# Patient Record
Sex: MALE | Race: BLACK OR AFRICAN AMERICAN | URBAN - METROPOLITAN AREA
[De-identification: names, ages, dates, MRNs, and addresses within clinical notes are randomized per-mention and may not be internally consistent; named-entity substitution may affect disease eponyms.]

---

## 2018-04-01 ENCOUNTER — OUTPATIENT (OUTPATIENT)
Dept: OUTPATIENT SERVICES | Facility: HOSPITAL | Age: 58
LOS: 1 days | End: 2018-04-01
Payer: MEDICAID

## 2018-04-01 PROCEDURE — G9001: CPT

## 2018-04-20 ENCOUNTER — INPATIENT (INPATIENT)
Facility: HOSPITAL | Age: 58
LOS: 9 days | Discharge: HOME | End: 2018-04-30
Attending: INTERNAL MEDICINE | Admitting: INTERNAL MEDICINE
Payer: MEDICAID

## 2018-04-20 VITALS
TEMPERATURE: 98 F | SYSTOLIC BLOOD PRESSURE: 182 MMHG | WEIGHT: 214.95 LBS | HEIGHT: 72 IN | OXYGEN SATURATION: 99 % | RESPIRATION RATE: 18 BRPM | HEART RATE: 77 BPM | DIASTOLIC BLOOD PRESSURE: 110 MMHG

## 2018-04-20 PROBLEM — Z00.00 ENCOUNTER FOR PREVENTIVE HEALTH EXAMINATION: Status: ACTIVE | Noted: 2018-04-20

## 2018-04-20 LAB
ABO RH CONFIRMATION: SIGNIFICANT CHANGE UP
ALBUMIN SERPL ELPH-MCNC: 3.1 G/DL — LOW (ref 3.3–5)
ALP SERPL-CCNC: 56 U/L — SIGNIFICANT CHANGE UP (ref 40–120)
ALT FLD-CCNC: 39 U/L — SIGNIFICANT CHANGE UP (ref 12–78)
ANION GAP SERPL CALC-SCNC: 16 MMOL/L — SIGNIFICANT CHANGE UP (ref 5–17)
APPEARANCE UR: CLEAR — SIGNIFICANT CHANGE UP
AST SERPL-CCNC: 20 U/L — SIGNIFICANT CHANGE UP (ref 15–37)
BACTERIA # UR AUTO: ABNORMAL
BASOPHILS # BLD AUTO: 0.01 K/UL — SIGNIFICANT CHANGE UP (ref 0–0.2)
BASOPHILS NFR BLD AUTO: 0.1 % — SIGNIFICANT CHANGE UP (ref 0–2)
BILIRUB SERPL-MCNC: 0.4 MG/DL — SIGNIFICANT CHANGE UP (ref 0.2–1.2)
BILIRUB UR-MCNC: NEGATIVE — SIGNIFICANT CHANGE UP
BLD GP AB SCN SERPL QL: SIGNIFICANT CHANGE UP
BUN SERPL-MCNC: 156 MG/DL — SIGNIFICANT CHANGE UP (ref 7–23)
CALCIUM SERPL-MCNC: 7.1 MG/DL — LOW (ref 8.5–10.1)
CHLORIDE SERPL-SCNC: 110 MMOL/L — HIGH (ref 96–108)
CK MB CFR SERPL CALC: 3.7 NG/ML — HIGH (ref 0.5–3.6)
CK SERPL-CCNC: 154 U/L — SIGNIFICANT CHANGE UP (ref 26–308)
CO2 SERPL-SCNC: 16 MMOL/L — LOW (ref 22–31)
COLOR SPEC: YELLOW — SIGNIFICANT CHANGE UP
CREAT SERPL-MCNC: 13.5 MG/DL — HIGH (ref 0.5–1.3)
D DIMER BLD IA.RAPID-MCNC: 534 NG/ML DDU — HIGH
DIFF PNL FLD: ABNORMAL
EOSINOPHIL # BLD AUTO: 0.03 K/UL — SIGNIFICANT CHANGE UP (ref 0–0.5)
EOSINOPHIL NFR BLD AUTO: 0.2 % — SIGNIFICANT CHANGE UP (ref 0–6)
EPI CELLS # UR: SIGNIFICANT CHANGE UP
GLUCOSE SERPL-MCNC: 103 MG/DL — HIGH (ref 70–99)
GLUCOSE UR QL: NEGATIVE MG/DL — SIGNIFICANT CHANGE UP
HCT VFR BLD CALC: 19.6 % — CRITICAL LOW (ref 39–50)
HGB BLD-MCNC: 6.4 G/DL — CRITICAL LOW (ref 13–17)
IMM GRANULOCYTES NFR BLD AUTO: 1.5 % — SIGNIFICANT CHANGE UP (ref 0–1.5)
KETONES UR-MCNC: NEGATIVE — SIGNIFICANT CHANGE UP
LACTATE SERPL-SCNC: 1.1 MMOL/L — SIGNIFICANT CHANGE UP (ref 0.7–2)
LEUKOCYTE ESTERASE UR-ACNC: NEGATIVE — SIGNIFICANT CHANGE UP
LYMPHOCYTES # BLD AUTO: 1.04 K/UL — SIGNIFICANT CHANGE UP (ref 1–3.3)
LYMPHOCYTES # BLD AUTO: 8.1 % — LOW (ref 13–44)
MAGNESIUM SERPL-MCNC: 1.4 MG/DL — LOW (ref 1.6–2.6)
MCHC RBC-ENTMCNC: 27.1 PG — SIGNIFICANT CHANGE UP (ref 27–34)
MCHC RBC-ENTMCNC: 32.7 GM/DL — SIGNIFICANT CHANGE UP (ref 32–36)
MCV RBC AUTO: 83.1 FL — SIGNIFICANT CHANGE UP (ref 80–100)
MONOCYTES # BLD AUTO: 2.01 K/UL — HIGH (ref 0–0.9)
MONOCYTES NFR BLD AUTO: 15.6 % — HIGH (ref 2–14)
NEUTROPHILS # BLD AUTO: 9.62 K/UL — HIGH (ref 1.8–7.4)
NEUTROPHILS NFR BLD AUTO: 74.5 % — SIGNIFICANT CHANGE UP (ref 43–77)
NITRITE UR-MCNC: NEGATIVE — SIGNIFICANT CHANGE UP
NRBC # BLD: 0 /100 WBCS — SIGNIFICANT CHANGE UP (ref 0–0)
NT-PROBNP SERPL-SCNC: HIGH PG/ML (ref 0–125)
PH UR: 5 — SIGNIFICANT CHANGE UP (ref 5–8)
PLATELET # BLD AUTO: 155 K/UL — SIGNIFICANT CHANGE UP (ref 150–400)
POTASSIUM SERPL-MCNC: 5.4 MMOL/L — HIGH (ref 3.5–5.3)
POTASSIUM SERPL-SCNC: 5.4 MMOL/L — HIGH (ref 3.5–5.3)
PROT SERPL-MCNC: 6.3 GM/DL — SIGNIFICANT CHANGE UP (ref 6–8.3)
PROT UR-MCNC: 500 MG/DL
RBC # BLD: 2.36 M/UL — LOW (ref 4.2–5.8)
RBC # FLD: 16.4 % — HIGH (ref 10.3–14.5)
RBC CASTS # UR COMP ASSIST: ABNORMAL /HPF (ref 0–4)
SODIUM SERPL-SCNC: 142 MMOL/L — SIGNIFICANT CHANGE UP (ref 135–145)
SP GR SPEC: 1.01 — SIGNIFICANT CHANGE UP (ref 1.01–1.02)
TROPONIN I SERPL-MCNC: 0.05 NG/ML — HIGH (ref 0.01–0.04)
TROPONIN I SERPL-MCNC: 0.05 NG/ML — HIGH (ref 0.01–0.04)
UROBILINOGEN FLD QL: NEGATIVE MG/DL — SIGNIFICANT CHANGE UP
WBC # BLD: 12.9 K/UL — HIGH (ref 3.8–10.5)
WBC # FLD AUTO: 12.9 K/UL — HIGH (ref 3.8–10.5)
WBC UR QL: SIGNIFICANT CHANGE UP

## 2018-04-20 PROCEDURE — 99291 CRITICAL CARE FIRST HOUR: CPT

## 2018-04-20 PROCEDURE — 71045 X-RAY EXAM CHEST 1 VIEW: CPT | Mod: 26

## 2018-04-20 PROCEDURE — 71250 CT THORAX DX C-: CPT | Mod: 26

## 2018-04-20 PROCEDURE — 93970 EXTREMITY STUDY: CPT | Mod: 26

## 2018-04-20 RX ORDER — AMLODIPINE BESYLATE 2.5 MG/1
10 TABLET ORAL DAILY
Qty: 0 | Refills: 0 | Status: DISCONTINUED | OUTPATIENT
Start: 2018-04-21 | End: 2018-04-30

## 2018-04-20 RX ORDER — METOPROLOL TARTRATE 50 MG
50 TABLET ORAL DAILY
Qty: 0 | Refills: 0 | Status: DISCONTINUED | OUTPATIENT
Start: 2018-04-21 | End: 2018-04-22

## 2018-04-20 RX ORDER — MAGNESIUM SULFATE 500 MG/ML
2 VIAL (ML) INJECTION ONCE
Qty: 0 | Refills: 0 | Status: COMPLETED | OUTPATIENT
Start: 2018-04-20 | End: 2018-04-20

## 2018-04-20 RX ORDER — AMLODIPINE BESYLATE 2.5 MG/1
5 TABLET ORAL DAILY
Qty: 0 | Refills: 0 | Status: DISCONTINUED | OUTPATIENT
Start: 2018-04-20 | End: 2018-04-20

## 2018-04-20 RX ORDER — FUROSEMIDE 40 MG
60 TABLET ORAL ONCE
Qty: 0 | Refills: 0 | Status: COMPLETED | OUTPATIENT
Start: 2018-04-20 | End: 2018-04-20

## 2018-04-20 RX ORDER — CHOLECALCIFEROL (VITAMIN D3) 125 MCG
400 CAPSULE ORAL DAILY
Qty: 0 | Refills: 0 | Status: DISCONTINUED | OUTPATIENT
Start: 2018-04-20 | End: 2018-04-30

## 2018-04-20 RX ORDER — CEFTRIAXONE 500 MG/1
1 INJECTION, POWDER, FOR SOLUTION INTRAMUSCULAR; INTRAVENOUS ONCE
Qty: 0 | Refills: 0 | Status: COMPLETED | OUTPATIENT
Start: 2018-04-20 | End: 2018-04-20

## 2018-04-20 RX ORDER — METOPROLOL TARTRATE 50 MG
25 TABLET ORAL DAILY
Qty: 0 | Refills: 0 | Status: DISCONTINUED | OUTPATIENT
Start: 2018-04-20 | End: 2018-04-20

## 2018-04-20 RX ORDER — PETROLATUM,WHITE
1 JELLY (GRAM) TOPICAL
Qty: 0 | Refills: 0 | Status: DISCONTINUED | OUTPATIENT
Start: 2018-04-20 | End: 2018-04-30

## 2018-04-20 RX ORDER — AMLODIPINE BESYLATE 2.5 MG/1
0 TABLET ORAL
Qty: 0 | Refills: 0 | COMMUNITY

## 2018-04-20 RX ORDER — ALLOPURINOL 300 MG
1 TABLET ORAL
Qty: 0 | Refills: 0 | COMMUNITY

## 2018-04-20 RX ORDER — FUROSEMIDE 40 MG
40 TABLET ORAL ONCE
Qty: 0 | Refills: 0 | Status: COMPLETED | OUTPATIENT
Start: 2018-04-20 | End: 2018-04-20

## 2018-04-20 RX ORDER — ACETAMINOPHEN 500 MG
650 TABLET ORAL EVERY 6 HOURS
Qty: 0 | Refills: 0 | Status: DISCONTINUED | OUTPATIENT
Start: 2018-04-20 | End: 2018-04-30

## 2018-04-20 RX ADMIN — Medication 1 APPLICATION(S): at 18:23

## 2018-04-20 RX ADMIN — CEFTRIAXONE 100 GRAM(S): 500 INJECTION, POWDER, FOR SOLUTION INTRAMUSCULAR; INTRAVENOUS at 12:41

## 2018-04-20 RX ADMIN — Medication 40 MILLIGRAM(S): at 22:20

## 2018-04-20 RX ADMIN — Medication 50 GRAM(S): at 13:33

## 2018-04-20 RX ADMIN — Medication 60 MILLIGRAM(S): at 12:41

## 2018-04-20 RX ADMIN — Medication 40 MILLIGRAM(S): at 16:15

## 2018-04-20 NOTE — ED PROVIDER NOTE - MEDICAL DECISION MAKING DETAILS
patient pw BEE. suspect volume overload patient pw BEE. suspect volume overload. I read ekg as nsr with LVH, rate 65, no st elevation or depression, no qtc prolongation, no pr prolongation, normal axis. patient pw BEE. suspect volume overload. I read ekg as nsr with LVH, rate 65, no st elevation or depression, no qtc prolongation, no pr prolongation, normal axis. CT shows bl effusions, more likely edema than infectious, based on symptoms. will diurese and consult renal. Patient needs blood transfusion but is not presently sure about receiving it.

## 2018-04-20 NOTE — H&P ADULT - ASSESSMENT
patient  seen orders  writte  discussed  with  patient  and  pt's  wife reenal  cardio  pulmonary  consults  calledwill  write  full note  later

## 2018-04-20 NOTE — PATIENT PROFILE ADULT. - TEACHING/LEARNING LEARNING PREFERENCES
group instruction/individual instruction/verbal instruction/audio/computer/internet/pictorial/video/skill demonstration/written material

## 2018-04-20 NOTE — H&P ADULT - NSHPLABSRESULTS_GEN_ALL_CORE
LABS:                        6.4    12. )-----------( 155      ( 2018 09:32 )             19.6     -    142  |  110<H>  |  156  ----------------------------<  103<H>  5.4<H>   |  16<L>  |  13.50<H>    Ca    7.1<L>      2018 09:32  Mg     1.4         TPro  6.3  /  Alb  3.1<L>  /  TBili  0.4  /  DBili  x   /  AST  20  /  ALT  39  /  AlkPhos  56        Urinalysis Basic - ( 2018 12:26 )    Color: Yellow / Appearance: Clear / S.010 / pH: x  Gluc: x / Ketone: Negative  / Bili: Negative / Urobili: Negative mg/dL   Blood: x / Protein: 500 mg/dL / Nitrite: Negative   Leuk Esterase: Negative / RBC: 3-5 /HPF / WBC 3-5   Sq Epi: x / Non Sq Epi: Few / Bacteria: Few

## 2018-04-20 NOTE — H&P ADULT - NSHPPHYSICALEXAM_GEN_ALL_CORE
PHYSICAL EXAM:    GENERAL: NAD, well-groomed, well-developed  HEAD:  Atraumatic, Normocephalic  EYES: EOMI, PERRLA, conjunctiva   pale   ENMT: No tonsillar erythema, exudates, or enlargement; Moist mucous membranes, , No lesions  NECK: Supple, No JVD, Normal thyroid  NERVOUS SYSTEM:  Alert & Oriented X4, ; Motor Strength 5/5 B/L upper and lower extremities; DTRs 2+ intact and symmetric  CHEST/LUNG: decreased  bs bilaterally; No rales, rhonchi, wheezing, or rubs  HEART: Regular rate and rhythm; No murmurs, rubs, or gallops  ABDOMEN: Soft, Nontender, Nondistended; no  masses Bowel sounds present  EXTREMITIES:  + Peripheral Pulses, No clubbing, cyanosis, or edema  LYMPH: No lymphadenopathy noted   RECTAL: deferred

## 2018-04-20 NOTE — CONSULT NOTE ADULT - SUBJECTIVE AND OBJECTIVE BOX
HPI:  HPI:  PT  C/O  BEE  THIS  AM  WHILE  WALKING  IN  KITCHEN  NO  CP  NO  PALPITATIONS  EVALUATED  IN  ER  FOUND  TO  HAVE  BILATERAL  PLEURAL  EFFUSIONS  SEVERE  ANEMIA  ACUTE  ON  CHRONIC  RENAL  FAILURE TROPONIN  ELEVATION  ADMITTED  FOR  FURTHER  W/U  AND  TREATMENT (2018 13:08)      Chief Complaint:  Patient is a 57y old  Male who presents with a chief complaint of BEE  PLEURAL  EFFUSION  ANEMIA RENAL  FAILURE (2018 13:08)      Review of Systems:  see above         Social History/Family History  SOCHX:   tobacco,  -  alcohol    FMHX: FA/MO  - contributory       Discussed with:  PMD, Family    Physical Exam:    Vital Signs:  Vital Signs Last 24 Hrs  T(C): 36.7 (2018 17:24), Max: 36.7 (2018 17:24)  T(F): 98 (2018 17:24), Max: 98 (2018 17:24)  HR: 86 (2018 20:53) (70 - 86)  BP: 181/99 (2018 17:24) (137/93 - 182/110)  BP(mean): 9 (2018 16:24) (9 - 9)  RR: 18 (2018 17:24) (18 - 18)  SpO2: 97% (2018 20:53) (95% - 99%)  Daily Height in cm: 182.88 (2018 08:44)    Daily   I&O's Summary    2018 07:01  -  2018 22:05  --------------------------------------------------------  IN: 0 mL / OUT: 300 mL / NET: -300 mL            Chest:  Full & symmetric excursion, no increased effort, breath sounds clear  Cardiovascular:  Regular rhythm, S1, S2, no murmur/rub/S3/S4, no carotid/femoral/abdominal bruit, radial/pedal pulses 2+, no edema  Abdomen:  Soft, non-tender, non-distended, normoactive bowel sounds, no HSM       Laboratory:                          6.4    12.90 )-----------( 155      ( 2018 09:32 )             19.6     04-20    142  |  110<H>  |  156  ----------------------------<  103<H>  5.4<H>   |  16<L>  |  13.50<H>    Ca    7.1<L>      2018 09:32  Mg     1.4         TPro  6.3  /  Alb  3.1<L>  /  TBili  0.4  /  DBili  x   /  AST  20  /  ALT  39  /  AlkPhos  56        CARDIAC MARKERS ( 2018 15:48 )  .049 ng/mL / x     / 154 U/L / x     / x      CARDIAC MARKERS ( 2018 09:32 )  .047 ng/mL / x     / x     / x     / 3.7 ng/mL      CAPILLARY BLOOD GLUCOSE        LIVER FUNCTIONS - ( 2018 09:32 )  Alb: 3.1 g/dL / Pro: 6.3 gm/dL / ALK PHOS: 56 U/L / ALT: 39 U/L / AST: 20 U/L / GGT: x             Urinalysis Basic - ( 2018 12:26 )    Color: Yellow / Appearance: Clear / S.010 / pH: x  Gluc: x / Ketone: Negative  / Bili: Negative / Urobili: Negative mg/dL   Blood: x / Protein: 500 mg/dL / Nitrite: Negative   Leuk Esterase: Negative / RBC: 3-5 /HPF / WBC 3-5   Sq Epi: x / Non Sq Epi: Few / Bacteria: Few          Assessment:    Renal failure,   ESRD  SOB AND symptomatology primarily renal  Patient states had stress test and CV w/u a few months ago for renal transplant consideration  All negative

## 2018-04-20 NOTE — ED ADULT NURSE NOTE - READING LANGUAGE PREFERRED
[facility] ICU RESPIRATORY NOTE  Date of Admission:  6/2/17  Date of Intubation (most recent): 6/12/17  Reason for Mechanical Ventilation:  Resp failure  Number of Days on Mechanical Ventilation: 6  Met Criteria for Pressure Support Trial: Yes  Length of Pressure Support Trial:  8/5 from 1530 to present  Reason for Stopping Pressure Support Trial:  Still on PS trial  Reason for No Pressure Support Trial:    Significant Events Today: None    ABG Results:    ETT appearance on chest x-ray:    Plan:  Pt to remain on full vent support overnight       English

## 2018-04-20 NOTE — ED ADULT NURSE NOTE - CHPI ED SYMPTOMS NEG
no headache/no cough/no chills/no hemoptysis/no diaphoresis/no body aches/no chest pain/no fever/no wheezing

## 2018-04-20 NOTE — CONSULT NOTE ADULT - SUBJECTIVE AND OBJECTIVE BOX
Information from chart:  "Patient is a 57y old  Male who presents with a chief complaint of BEE  PLEURAL  EFFUSION  ANEMIA RENAL  FAILURE (2018 13:08)    HPI:  PT  C/O  BEE  THIS  AM  WHILE  WALKING  IN  KITCHEN  NO  CP  NO  PALPITATIONS  EVALUATED  IN  ER  FOUND  TO  HAVE  BILATERAL  PLEURAL  EFFUSIONS  SEVERE  ANEMIA  ACUTE  ON  CHRONIC  RENAL  FAILURE TROPONIN  ELEVATION  ADMITTED  FOR  FURTHER  W/U  AND  TREATMENT (2018 13:08)   "  Patient with hx of advanced CKD impending ESRD;   Fell while waking from sleep?; unclear if LOC;   No chest pain, n/v; alert X4  Denies SOB   No overt blood loss; no BRBPR or melena      PAST MEDICAL & SURGICAL HISTORY:    FAMILY HISTORY:    Allergies    No Known Allergies    Intolerances      Home Medications:  amLODIPine: orally once a day (2018 10:18)  cholecalciferol:  (2018 10:18)  lisinopril: orally once a day (2018 10:18)  Toprol-XL: orally once a day (2018 10:18)  zolpidem:  (2018 10:18)    MEDICATIONS  (STANDING):  AQUAPHOR (petrolatum Ointment) 1 Application(s) Topical two times a day  cholecalciferol 400 Unit(s) Oral daily    MEDICATIONS  (PRN):  acetaminophen   Tablet. 650 milliGRAM(s) Oral every 6 hours PRN Mild Pain (1 - 3)    Vital Signs Last 24 Hrs  T(C): 36.3 (2018 11:37), Max: 36.4 (2018 08:44)  T(F): 97.3 (2018 11:37), Max: 97.5 (2018 08:44)  HR: 70 (2018 11:37) (70 - 77)  BP: 137/93 (2018 11:37) (137/93 - 182/110)  BP(mean): --  RR: 18 (2018 11:37) (18 - 18)  SpO2: 99% (2018 11:37) (99% - 99%)    Daily Height in cm: 182.88 (2018 08:44)    Daily     18 @ 07:01  -  18 @ 15:11  --------------------------------------------------------  IN: 0 mL / OUT: 300 mL / NET: -300 mL      CAPILLARY BLOOD GLUCOSE        PHYSICAL EXAM:      T(C): 36.3 (18 @ 11:37), Max: 36.4 (18 @ 08:44)  HR: 70 (18 @ 11:37) (70 - 77)  BP: 137/93 (18 @ 11:37) (137/93 - 182/110)  RR: 18 (18 @ 11:37) (18 - 18)  SpO2: 99% (18 @ 11:37) (99% - 99%)  Wt(kg): --  Respiratory: clear anteriorly, decreased BS at bases  Cardiovascular: S1 S2 no rub  Gastrointestinal: soft NT ND +BS  Extremities:   1 edema                  142  |  110<H>  |  156  ----------------------------<  103<H>  5.4<H>   |  16<L>  |  13.50<H>    Ca    7.1<L>      2018 09:32  Mg     1.4         TPro  6.3  /  Alb  3.1<L>  /  TBili  0.4  /  DBili  x   /  AST  20  /  ALT  39  /  AlkPhos  56                            6.4    12.90 )-----------( 155      ( 2018 09:32 )             19.6     Urinalysis Basic - ( 2018 12:26 )    Color: Yellow / Appearance: Clear / S.010 / pH: x  Gluc: x / Ketone: Negative  / Bili: Negative / Urobili: Negative mg/dL   Blood: x / Protein: 500 mg/dL / Nitrite: Negative   Leuk Esterase: Negative / RBC: 3-5 /HPF / WBC 3-5   Sq Epi: x / Non Sq Epi: Few / Bacteria: Few        Assessment and Plan    EMMETT CKD 5 suspected impending ESRD; s/p fall with anemia, possible acute on chronic.  Mild/ Moderate fluid overload;   Discussed with patient the prospect of initiating HD; indications include fluid overload and potential electrolyte imbalance.  He is currently hesitant but understands it may be needed this admission;   Would transfuse one unit today and another tomorrow unless active bleeding;   CT abd pelvis r/o RPB; guaic stool  Will revisit issue of starting HD tomorrow.

## 2018-04-20 NOTE — ED ADULT TRIAGE NOTE - CHIEF COMPLAINT QUOTE
Reports feeling short of breath starting at 2 am, worsens with activity, lessens with rest, denies cp, nausea, vomiting, fever, chills. hx of kidney failure.

## 2018-04-20 NOTE — CONSULT NOTE ADULT - SUBJECTIVE AND OBJECTIVE BOX
Patient is a 57y old  Male who presents with a chief complaint of BEE  PLEURAL  EFFUSION  ANEMIA RENAL  FAILURE (2018 13:08)    HPI:  57 year male with HTN, Obesity, JANETTE(on CPAP), Advanced Kidney disease(on transplant list), now being considered for hemodialysis. Had fall from side of bed yesterday, no LOC, did not hit head.  Came with SOB for one day, more with exertion then at rest. At some point had feeling as it was hard to take a full breath but denies chest pain. No fever, chills, cough or sputum production.  Some times smoke cigar.    PAST MEDICAL & SURGICAL HISTORY:  as above.    FAMILY HISTORY:   not contributory.    SOCIAL HISTORY: BMI (kg/m2): 29.2 . Cigar at times.    Allergies  No Known Allergies    MEDICATIONS  (STANDING):  AQUAPHOR (petrolatum Ointment) 1 Application(s) Topical two times a day  cholecalciferol 400 Unit(s) Oral daily    MEDICATIONS  (PRN):  acetaminophen   Tablet. 650 milliGRAM(s) Oral every 6 hours PRN Mild Pain (1 - 3)    REVIEW OF SYSTEMS:    Constitutional:            No fever, weight loss or fatigue  HEENT:                      No difficulty hearing, tinnitus, vertigo; No sinus or throat pain  Respiratory:               exertional sob.  Cardiovascular:           No chest pain, palpitations  Gastrointestinal:        No abdominal or epigastric pain. No N/V/diarrhea or hematemesis  Genitourinary:            No dysuria, frequency, hematuria or incontinence  SKIN:                             no rash  Musculoskeletal:        No joint pain or swelling  Extremities:               swelling  Neurological:              No headaches  Psychiatric:                 No depression, anxiety    PQRS:  Vaccines - Influenza and Pneumovax:  BMI:  Tobacco:  Depression:   Colorectal Screening:  Breast Cancer Screening:  Blood Presssure Screening / Control of:  HbAIc:  Ischemic Vascular Disease:  Current Medications Reviewed:    Vital Signs Last 24 Hrs  T(C): 36.3 (2018 16:24), Max: 36.4 (2018 08:44)  T(F): 97.4 (2018 16:24), Max: 97.5 (2018 08:44)  HR: 70 (2018 16:24) (70 - 77)  BP: 159/89 (2018 16:24) (137/93 - 182/110)  BP(mean): 9 (2018 16:24) (9 - 9)  RR: 18 (2018 16:24) (18 - 18)  SpO2: 99% (2018 16:24) (99% - 99%)    PHYSICAL EXAM:  GEN:         Awake, responsive and comfortable.  HEENT:    Normal.    RESP:       Decreased air entry.  CVS:           Regular rate and rhythm.   ABD:         Soft, non-tender, non-distended; obese,  :             No costovertebral angle tenderness  SKIN:           Warm and dry.  EXTR:           edema  CNS:              Intact sensory and motor function.  PSYCH:        cooperative, no anxiety or depression    LABS:                        6.4    12.90 )-----------( 155      ( 2018 09:32 )             19.6     04-20    142  |  110<H>  |  156  ----------------------------<  103<H>  5.4<H>   |  16<L>  |  13.50<H>    Ca    7.1<L>      2018 09:32  Mg     1.4         TPro  6.3  /  Alb  3.1<L>  /  TBili  0.4  /  DBili  x   /  AST  20  /  ALT  39  /  AlkPhos  56  -    Urinalysis Basic - ( 2018 12:26 )    Color: Yellow / Appearance: Clear / S.010 / pH: x  Gluc: x / Ketone: Negative  / Bili: Negative / Urobili: Negative mg/dL   Blood: x / Protein: 500 mg/dL / Nitrite: Negative   Leuk Esterase: Negative / RBC: 3-5 /HPF / WBC 3-5   Sq Epi: x / Non Sq Epi: Few / Bacteria: Few    EKG: sinus    RADIOLOGY & ADDITIONAL STUDIES:  < from: CT Chest No Cont (18 @ 11:05) >    EXAM:  CT CHEST                          PROCEDURE DATE:  2018      INTERPRETATION:  CLINICAL INFORMATION: Shortness of breath    COMPARISON: None.    PROCEDURE:   CT of the Chest was performed without intravenous contrast.  Sagittal and coronal reformats were performed.      FINDINGS:    CHEST:     LUNGS AND LARGE AIRWAYS: Patent central airways.  Bilateral groundglass   opacities. Scattered nodular opacities measuring up to 1.1 cm. Dependent   atelectasis. Calcified granulomas.  PLEURA: Small bilateral pleural effusions.  VESSELS: Atherosclerotic changes of thoracic aorta and coronary arteries.  HEART: Mildly enlarged. No pericardial effusion.  MEDIASTINUM AND CHIP: No lymphadenopathy.  CHEST WALL AND LOWER NECK: Multiple thyroid lesions, largest in the left   thyroid gland measures 3.1 cm. Bilateral gynecomastia.  VISUALIZED UPPER ABDOMEN: Status post cholecystectomy. Hypodense renal   lesions measuring up to 6.4 cm, possibly cysts. Colonic diverticulosis.   Trace ascites.  BONES: Spinal degenerative changes.    IMPRESSION:     Small bilateral pleural effusions.  Bilateral groundglass opacities, which may represent pulmonary edema or   pneumonia.  Multiple thyroid lesions, largest measuring 3.1 cm. Nonemergent thyroid  ultrasound may be obtained for further assessment.    CASSY PALMA M.D., ATTENDING RADIOLOGIST  This document has been electronically signed. 2018 11:23AM      ASSESSMENT AND PLAN:  ·	SOB.  ·	Volume retention.  ·	Bilateral small pleural effusion.  ·	CKD5.  ·	Anemia.  ·	Leukocytosis.  ·	HTN.  ·	Obesity.  ·	JANETTE.    For PRBC transfusion.  Hemodialysis being discussed by Nephrology.  Will Obtain room air ABG.  Echocardiogram.  Pleura effusion is very small and no intervention needed.

## 2018-04-20 NOTE — CHART NOTE - NSCHARTNOTEFT_GEN_A_CORE
House PA note    Called by RN to report pt w/ left ankle pain due to gout.  Pt seen at bedside, c/o left ankle pain which started this afternoon. Pt w/ h/o gout, was on allopurinol. D/Alex due to renal insufficiency. Pt w/ frequent gout attacks, last episode 2-4 weeks ago. Treated with prednisone.  As per pt, renal doctor started him on prednisone 40 mg as needed for acute episodes.   -prednisone 40 mg X1 ordered  -d/w Dr. Burks.

## 2018-04-20 NOTE — H&P ADULT - HISTORY OF PRESENT ILLNESS
PT  C/O  BEE  THIS  AM  WHILE  WALKING  IN  KITCHEN  NO  CP  NO  PALPITATIONS  EVALUATED  IN  ER  FOUND  TO  HAVE  BILATERAL  PLEURAL  EFFUSIONS  SEVERE  ANEMIA  ACUTE  ON  CHRONIC  RENAL  FAILURE TROPONIN  ELEVATION  ADMITTED  FOR  FURTHER  W/U  AND  TREATMENT

## 2018-04-20 NOTE — ED ADULT NURSE REASSESSMENT NOTE - NS ED NURSE REASSESS COMMENT FT1
Subjective:  HPI                    Objective:  System    Physical Exam    General     ROS    Assessment/Plan:    PROGRESS  REPORT    Progress reporting period is from 1/19/18 to 2/7/18 (4 visits).       SUBJECTIVE  Subjective changes noted by patient:  Al is reporting no knee pain now, but developed pain in his lateral thigh running across his distal quadricep after snowblowing on 1/30/18.  The pain was constant, but has changed within the last week.  He now reports intermittent pain in the anterior > lateral right hip.  Felt when walking or standing.         Current pain level is 4/10  .     Previous pain level was n/a   .   Changes in function:  Yes (See Goal flowsheet attached for changes in current functional level)  Adverse reaction to treatment or activity: activity - snowblowing    OBJECTIVE  Changes noted in objective findings:  Right knee checks out normal.  Checks of the right hip and low back today to determine the cause of the hip pain are inconclusive, but point more toward his hip as the cause of the pain.        ASSESSMENT/PLAN  Updated problem list and treatment plan: Diagnosis 1:  Right knee infrapatellar tendonitis    STG/LTGs have been met or progress has been made towards goals:  Yes (See Goal flow sheet completed today.)  Assessment of Progress: The patient's condition is improving.  Self Management Plans:  Patient has been instructed in a home treatment program.    Mynor continues to require the following intervention to meet STG and LTG's:  PT is no longer needed for his knee, but may be warranted pending the cause of the hip pain.    Recommendations:  This patient would benefit from further evaluation.    Please refer to the daily flowsheet for treatment today, total treatment time and time spent performing 1:1 timed codes.            
patient is receiving 1 unit of PRBC, bp of 176/102, 15 minutes post transfusion, patient asymptomatic, verbal order received for lasix, 40 mg ivp

## 2018-04-20 NOTE — ED PROVIDER NOTE - OBJECTIVE STATEMENT
Pertinent PMH/PSH/FHx/SHx and Review of Systems contained within:  57m hx htn, octavio on cpap pw sob and ojeda. patient was in bed last night and noted that he was having a hard time breathing, particularly when exerting or when flat on back. no cp, cough, fever, abd pain, swelling, ha, vision change, rash, bleeding. patient is getting ready to be transitioned to HD for ckd.   did not take anything for his smyptoms  Fh and Sh not otherwise contributory  ROS otherwise negative

## 2018-04-21 DIAGNOSIS — N18.6 END STAGE RENAL DISEASE: ICD-10-CM

## 2018-04-21 DIAGNOSIS — E87.5 HYPERKALEMIA: ICD-10-CM

## 2018-04-21 DIAGNOSIS — I10 ESSENTIAL (PRIMARY) HYPERTENSION: ICD-10-CM

## 2018-04-21 DIAGNOSIS — R06.02 SHORTNESS OF BREATH: ICD-10-CM

## 2018-04-21 DIAGNOSIS — D64.9 ANEMIA, UNSPECIFIED: ICD-10-CM

## 2018-04-21 LAB
ALBUMIN SERPL ELPH-MCNC: 3 G/DL — LOW (ref 3.3–5)
ALP SERPL-CCNC: 66 U/L — SIGNIFICANT CHANGE UP (ref 40–120)
ALT FLD-CCNC: 58 U/L — SIGNIFICANT CHANGE UP (ref 12–78)
ANION GAP SERPL CALC-SCNC: 15 MMOL/L — SIGNIFICANT CHANGE UP (ref 5–17)
AST SERPL-CCNC: 23 U/L — SIGNIFICANT CHANGE UP (ref 15–37)
BILIRUB SERPL-MCNC: 0.7 MG/DL — SIGNIFICANT CHANGE UP (ref 0.2–1.2)
BUN SERPL-MCNC: 151 MG/DL — SIGNIFICANT CHANGE UP (ref 7–23)
CALCIUM SERPL-MCNC: 7.2 MG/DL — LOW (ref 8.5–10.1)
CHLORIDE SERPL-SCNC: 110 MMOL/L — HIGH (ref 96–108)
CK SERPL-CCNC: 143 U/L — SIGNIFICANT CHANGE UP (ref 26–308)
CK SERPL-CCNC: 193 U/L — SIGNIFICANT CHANGE UP (ref 26–308)
CO2 SERPL-SCNC: 17 MMOL/L — LOW (ref 22–31)
CREAT SERPL-MCNC: 13.7 MG/DL — HIGH (ref 0.5–1.3)
CULTURE RESULTS: SIGNIFICANT CHANGE UP
FERRITIN SERPL-MCNC: 1132 NG/ML — HIGH (ref 30–400)
GLUCOSE SERPL-MCNC: 123 MG/DL — HIGH (ref 70–99)
HCT VFR BLD CALC: 23.9 % — LOW (ref 39–50)
HGB BLD-MCNC: 7.7 G/DL — LOW (ref 13–17)
HIV 1+2 AB+HIV1 P24 AG SERPL QL IA: SIGNIFICANT CHANGE UP
IRON SATN MFR SERPL: 20 % — SIGNIFICANT CHANGE UP (ref 16–55)
IRON SATN MFR SERPL: 49 UG/DL — SIGNIFICANT CHANGE UP (ref 45–165)
MCHC RBC-ENTMCNC: 27 PG — SIGNIFICANT CHANGE UP (ref 27–34)
MCHC RBC-ENTMCNC: 32.2 GM/DL — SIGNIFICANT CHANGE UP (ref 32–36)
MCV RBC AUTO: 83.9 FL — SIGNIFICANT CHANGE UP (ref 80–100)
NRBC # BLD: 0 /100 WBCS — SIGNIFICANT CHANGE UP (ref 0–0)
PHOSPHATE SERPL-MCNC: 8.8 MG/DL — HIGH (ref 2.5–4.5)
PLATELET # BLD AUTO: 147 K/UL — LOW (ref 150–400)
POTASSIUM SERPL-MCNC: 4.9 MMOL/L — SIGNIFICANT CHANGE UP (ref 3.5–5.3)
POTASSIUM SERPL-MCNC: 7 MMOL/L — CRITICAL HIGH (ref 3.5–5.3)
POTASSIUM SERPL-SCNC: 4.9 MMOL/L — SIGNIFICANT CHANGE UP (ref 3.5–5.3)
POTASSIUM SERPL-SCNC: 7 MMOL/L — CRITICAL HIGH (ref 3.5–5.3)
PROT SERPL-MCNC: 6.7 GM/DL — SIGNIFICANT CHANGE UP (ref 6–8.3)
RBC # BLD: 2.85 M/UL — LOW (ref 4.2–5.8)
RBC # FLD: 16.1 % — HIGH (ref 10.3–14.5)
SODIUM SERPL-SCNC: 142 MMOL/L — SIGNIFICANT CHANGE UP (ref 135–145)
SPECIMEN SOURCE: SIGNIFICANT CHANGE UP
TIBC SERPL-MCNC: 243 UG/DL — SIGNIFICANT CHANGE UP (ref 220–430)
TROPONIN I SERPL-MCNC: 0.04 NG/ML — SIGNIFICANT CHANGE UP (ref 0.01–0.04)
TROPONIN I SERPL-MCNC: 0.06 NG/ML — HIGH (ref 0.01–0.04)
UIBC SERPL-MCNC: 194 UG/DL — SIGNIFICANT CHANGE UP (ref 110–370)
WBC # BLD: 12.87 K/UL — HIGH (ref 3.8–10.5)
WBC # FLD AUTO: 12.87 K/UL — HIGH (ref 3.8–10.5)

## 2018-04-21 PROCEDURE — 71045 X-RAY EXAM CHEST 1 VIEW: CPT | Mod: 26

## 2018-04-21 PROCEDURE — 74176 CT ABD & PELVIS W/O CONTRAST: CPT | Mod: 26

## 2018-04-21 PROCEDURE — 93306 TTE W/DOPPLER COMPLETE: CPT | Mod: 26

## 2018-04-21 RX ORDER — SEVELAMER CARBONATE 2400 MG/1
1 POWDER, FOR SUSPENSION ORAL
Qty: 0 | Refills: 0 | COMMUNITY

## 2018-04-21 RX ORDER — DEXTROSE 50 % IN WATER 50 %
50 SYRINGE (ML) INTRAVENOUS ONCE
Qty: 0 | Refills: 0 | Status: COMPLETED | OUTPATIENT
Start: 2018-04-21 | End: 2018-04-21

## 2018-04-21 RX ORDER — INSULIN HUMAN 100 [IU]/ML
10 INJECTION, SOLUTION SUBCUTANEOUS ONCE
Qty: 0 | Refills: 0 | Status: COMPLETED | OUTPATIENT
Start: 2018-04-21 | End: 2018-04-21

## 2018-04-21 RX ORDER — LABETALOL HCL 100 MG
10 TABLET ORAL ONCE
Qty: 0 | Refills: 0 | Status: COMPLETED | OUTPATIENT
Start: 2018-04-21 | End: 2018-04-22

## 2018-04-21 RX ORDER — OXYCODONE AND ACETAMINOPHEN 5; 325 MG/1; MG/1
1 TABLET ORAL ONCE
Qty: 0 | Refills: 0 | Status: DISCONTINUED | OUTPATIENT
Start: 2018-04-21 | End: 2018-04-21

## 2018-04-21 RX ORDER — ZOLPIDEM TARTRATE 10 MG/1
0 TABLET ORAL
Qty: 0 | Refills: 0 | COMMUNITY

## 2018-04-21 RX ORDER — SODIUM POLYSTYRENE SULFONATE 4.1 MEQ/G
30 POWDER, FOR SUSPENSION ORAL ONCE
Qty: 0 | Refills: 0 | Status: COMPLETED | OUTPATIENT
Start: 2018-04-21 | End: 2018-04-21

## 2018-04-21 RX ORDER — CALCIUM GLUCONATE 100 MG/ML
1 VIAL (ML) INTRAVENOUS ONCE
Qty: 0 | Refills: 0 | Status: COMPLETED | OUTPATIENT
Start: 2018-04-21 | End: 2018-04-21

## 2018-04-21 RX ORDER — CALCITRIOL 0.5 UG/1
0 CAPSULE ORAL
Qty: 0 | Refills: 0 | COMMUNITY

## 2018-04-21 RX ORDER — METOPROLOL TARTRATE 50 MG
0 TABLET ORAL
Qty: 0 | Refills: 0 | COMMUNITY

## 2018-04-21 RX ORDER — ALBUTEROL 90 UG/1
2.5 AEROSOL, METERED ORAL ONCE
Qty: 0 | Refills: 0 | Status: COMPLETED | OUTPATIENT
Start: 2018-04-21 | End: 2018-04-21

## 2018-04-21 RX ADMIN — Medication 0.2 MILLIGRAM(S): at 18:09

## 2018-04-21 RX ADMIN — SODIUM POLYSTYRENE SULFONATE 30 GRAM(S): 4.1 POWDER, FOR SUSPENSION ORAL at 09:01

## 2018-04-21 RX ADMIN — OXYCODONE AND ACETAMINOPHEN 1 TABLET(S): 5; 325 TABLET ORAL at 23:30

## 2018-04-21 RX ADMIN — Medication 50 MILLILITER(S): at 09:01

## 2018-04-21 RX ADMIN — AMLODIPINE BESYLATE 10 MILLIGRAM(S): 2.5 TABLET ORAL at 06:13

## 2018-04-21 RX ADMIN — Medication 400 UNIT(S): at 12:36

## 2018-04-21 RX ADMIN — Medication 1 APPLICATION(S): at 06:13

## 2018-04-21 RX ADMIN — ALBUTEROL 2.5 MILLIGRAM(S): 90 AEROSOL, METERED ORAL at 08:25

## 2018-04-21 RX ADMIN — INSULIN HUMAN 10 UNIT(S): 100 INJECTION, SOLUTION SUBCUTANEOUS at 09:33

## 2018-04-21 RX ADMIN — OXYCODONE AND ACETAMINOPHEN 1 TABLET(S): 5; 325 TABLET ORAL at 04:30

## 2018-04-21 RX ADMIN — OXYCODONE AND ACETAMINOPHEN 1 TABLET(S): 5; 325 TABLET ORAL at 03:42

## 2018-04-21 RX ADMIN — Medication 0.2 MILLIGRAM(S): at 21:42

## 2018-04-21 RX ADMIN — Medication 200 GRAM(S): at 09:33

## 2018-04-21 RX ADMIN — Medication 50 MILLIGRAM(S): at 06:13

## 2018-04-21 NOTE — PROGRESS NOTE ADULT - SUBJECTIVE AND OBJECTIVE BOX
INTERVAL HPI/OVERNIGHT EVENTS:    s/p  transfusion  one  unit  PRBC  given  insulin  and  calcium  gluconate  for  hyerkalemia    REVIEW OF SYSTEMS:  CONSTITUTIONAL:  feels  well  present  no  complaints     NECK: No pain or stiffnes  RESPIRATORY: No SOB   CARDIOVASCULAR: No chest pain, palpitations, dizziness,   GASTROINTESTINAL: No abdominal pain. No nausea, vomiting,   NEUROLOGICAL: No headaches, no  blurry  vision no  dizziness  SKIN: No itching,   MUSCULOSKELETAL: No pain    MEDICATION:  acetaminophen   Tablet. 650 milliGRAM(s) Oral every 6 hours PRN  amLODIPine   Tablet 10 milliGRAM(s) Oral daily  AQUAPHOR (petrolatum Ointment) 1 Application(s) Topical two times a day  calcium gluconate IVPB 1 Gram(s) IV Intermittent once  cholecalciferol 400 Unit(s) Oral daily  insulin regular  human recombinant. 10 Unit(s) SubCutaneous once  metoprolol succinate ER 50 milliGRAM(s) Oral daily    Vital Signs Last 24 Hrs  T(C): 36.9 (2018 05:16), Max: 37 (2018 01:02)  T(F): 98.5 (2018 05:16), Max: 98.6 (2018 01:02)  HR: 71 (2018 08:38) (70 - 88)  BP: 157/87 (2018 05:16) (137/93 - 190/98)  BP(mean): 9 (2018 16:24) (9 - 9)  RR: 18 (2018 05:16) (18 - 18)  SpO2: 98% (2018 08:38) (95% - 100%)    PHYSICAL EXAM:  GENERAL: NAD, well-groomed, well-developed  EYES:  conjunctiva and sclera clear  ENMT:  Moist mucous membranes,   NECK: Supple, No JVD, Normal thyroid  NERVOUS SYSTEM:  Alert oriented   no  focal  deficits;   CHEST/LUNG: Clear    HEART: Regular rate and rhythm; No murmurs, rubs, or gallops  ABDOMEN: Soft, Nontender, Nondistended; Bowel sounds present  EXTREMITIES:  no  edema no  tenderness  SKIN: No rashes   LABS:                        7.7    12.87 )-----------( 147      ( 2018 06:32 )             23.9     04-21    142  |  110<H>  |  151  ----------------------------<  123<H>  7.0<HH>   |  17<L>  |  13.70<H>    Ca    7.2<L>      2018 06:32  Phos  8.8       Mg     1.4         TPro  6.7  /  Alb  3.0<L>  /  TBili  0.7  /  DBili  x   /  AST  23  /  ALT  58  /  AlkPhos  66        Urinalysis Basic - ( 2018 12:26 )    Color: Yellow / Appearance: Clear / S.010 / pH: x  Gluc: x / Ketone: Negative  / Bili: Negative / Urobili: Negative mg/dL   Blood: x / Protein: 500 mg/dL / Nitrite: Negative   Leuk Esterase: Negative / RBC: 3-5 /HPF / WBC 3-5   Sq Epi: x / Non Sq Epi: Few / Bacteria: Few      CAPILLARY BLOOD GLUCOSE          RADIOLOGY & ADDITIONAL TESTS:    Imaging reports  Personally Reviewed:  [ x] YES  [ ] NO    Consultant(s) Notes Reviewed:  [x ] YES  [ ] NO    Care Discussed with Consultants/Other Providers [ x] YES  [ ] NO

## 2018-04-21 NOTE — PROGRESS NOTE ADULT - SUBJECTIVE AND OBJECTIVE BOX
INTERVAL HPI/OVERNIGHT EVENTS:  comfortable 1st dialysis in progress    Vital Signs Last 24 Hrs  T(C): 37.1 (2018 18:35), Max: 37.1 (2018 18:35)  T(F): 98.8 (2018 18:35), Max: 98.8 (2018 18:35)  HR: 94 (2018 18:35) (71 - 94)  BP: 208/107 (2018 18:35) (157/87 - 208/107)  BP(mean): --  RR: 20 (2018 18:35) (18 - 20)  SpO2: 98% (2018 18:35) (96% - 100%)        PHYSICAL EXAM:  GEN:         Awake, responsive and comfortable.  HEENT:    Normal.    RESP:          mostly clear  CVS:             Regular rate and rhythm.   ABD:         Soft, non-tender, non-distended;   :             No costovertebral angle tenderness  EXT:            bilat L.E. edema  CNS:              Intact sensory and motor function.        MEDICATIONS  (STANDING):  amLODIPine   Tablet 10 milliGRAM(s) Oral daily  AQUAPHOR (petrolatum Ointment) 1 Application(s) Topical two times a day  cholecalciferol 400 Unit(s) Oral daily  cloNIDine 0.2 milliGRAM(s) Oral every 12 hours  metoprolol succinate ER 50 milliGRAM(s) Oral daily    MEDICATIONS  (PRN):  acetaminophen   Tablet. 650 milliGRAM(s) Oral every 6 hours PRN Mild Pain (1 - 3)        RADIOLOGY:             < from: Xray Chest 1 View- PORTABLE-Urgent (18 @ 14:44) >    EXAM:  XR CHEST PORTABLE URGENT 1V                            PROCEDURE DATE:  2018          INTERPRETATION:  XR CHEST PORTABLE URGENT 1V    Single AP view    HISTORY:  attempted central line placement    Comparison:  Chest x-ray 2018    Normal heart size. Small bilateral pleural effusions and/or atelectasis.   No pneumothorax.    IMPRESSION: No pneumothorax.                      GRACIA SWANSON M.D., ATTENDING RADIOLOGIST  This document has been electronically signed. 2018  3:49PM          < end of copied text >             7.7    12.87 )-----------( 147      ( 2018 06:32 )             23.9         142  |  110<H>  |  151  ----------------------------<  123<H>  7.0<HH>   |  17<L>  |  13.70<H>    Ca    7.2<L>      2018 06:32  Phos  8.8     -  Mg     1.4         TPro  6.7  /  Alb  3.0<L>  /  TBili  0.7  /  DBili  x   /  AST  23  /  ALT  58  /  AlkPhos  66          Urinalysis Basic - ( 2018 12:26 )    Color: Yellow / Appearance: Clear / S.010 / pH: x  Gluc: x / Ketone: Negative  / Bili: Negative / Urobili: Negative mg/dL   Blood: x / Protein: 500 mg/dL / Nitrite: Negative   Leuk Esterase: Negative / RBC: 3-5 /HPF / WBC 3-5   Sq Epi: x / Non Sq Epi: Few / Bacteria: Few            ASSESSMENT AND PLAN: ESRD, small bilat effusions. post dialysis cxr in am

## 2018-04-21 NOTE — PROCEDURE NOTE - NSPROCDETAILS_GEN_ALL_CORE
sterile dressing applied/sterile technique, catheter placed/lumen(s) aspirated and flushed/guidewire recovered

## 2018-04-21 NOTE — PROGRESS NOTE ADULT - SUBJECTIVE AND OBJECTIVE BOX
Assessment:    Renal failure,  ESRD, Nephrology noted, needed to make necessary arrangements for long term HD  SOB AND symptomatology primarily renal  Patient states had stress test and CV w/u a few months ago for renal transplant consideration  All negative

## 2018-04-21 NOTE — PROCEDURE NOTE - PROCEDURE
<<-----Click on this checkbox to enter Procedure Central line placement  04/21/2018    Active  DAMONK

## 2018-04-21 NOTE — PROGRESS NOTE ADULT - SUBJECTIVE AND OBJECTIVE BOX
Patient feels well no complaints today. Events noted; severe hyperkalemia;    MEDICATIONS  (STANDING):  amLODIPine   Tablet 10 milliGRAM(s) Oral daily  AQUAPHOR (petrolatum Ointment) 1 Application(s) Topical two times a day  cholecalciferol 400 Unit(s) Oral daily  cloNIDine 0.2 milliGRAM(s) Oral every 12 hours  metoprolol succinate ER 50 milliGRAM(s) Oral daily    MEDICATIONS  (PRN):  acetaminophen   Tablet. 650 milliGRAM(s) Oral every 6 hours PRN Mild Pain (1 - 3)      04-20-18 @ 07:01  -  04-21-18 @ 07:00  --------------------------------------------------------  IN: 0 mL / OUT: 300 mL / NET: -300 mL      PHYSICAL EXAM:      T(C): 36.9 (04-21-18 @ 12:12), Max: 37 (04-21-18 @ 01:02)  HR: 76 (04-21-18 @ 12:12) (70 - 88)  BP: 183/97 (04-21-18 @ 12:12) (157/87 - 190/98)  RR: 18 (04-21-18 @ 12:12) (18 - 18)  SpO2: 98% (04-21-18 @ 12:12) (95% - 100%)  Wt(kg): --  Respiratory: clear anteriorly, decreased BS at bases  Cardiovascular: S1 S2  Gastrointestinal: soft NT ND +BS  Extremities:   tr edema                                    7.7    12.87 )-----------( 147      ( 21 Apr 2018 06:32 )             23.9     04-21    142  |  110<H>  |  151  ----------------------------<  123<H>  7.0<HH>   |  17<L>  |  13.70<H>    Ca    7.2<L>      21 Apr 2018 06:32  Phos  8.8     04-21  Mg     1.4     04-20    TPro  6.7  /  Alb  3.0<L>  /  TBili  0.7  /  DBili  x   /  AST  23  /  ALT  58  /  AlkPhos  66  04-21      LIVER FUNCTIONS - ( 21 Apr 2018 06:32 )  Alb: 3.0 g/dL / Pro: 6.7 gm/dL / ALK PHOS: 66 U/L / ALT: 58 U/L / AST: 23 U/L / GGT: x             Assessment and Plan:    CKD 5 ESRD; severe hyperkalemia, high risk for cardiac arrhythmia and death;  Discussed with patient and family indications, goals and benefit of instituting HD; he has agreed to commence;  3 hrs 2 K bath;   Will follow course.  Will need perm cath placement and HD center placement prior to discharge.

## 2018-04-21 NOTE — CONSULT NOTE ADULT - SUBJECTIVE AND OBJECTIVE BOX
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       HPI:  PT  C/O  BEE  THIS  AM  WHILE  WALKING  IN  KITCHEN  NO  CP  NO  PALPITATIONS  EVALUATED  IN  ER  FOUND  TO  HAVE  BILATERAL  PLEURAL  EFFUSIONS  SEVERE  ANEMIA  ACUTE  ON  CHRONIC  RENAL  FAILURE TROPONIN  ELEVATION  ADMITTED  FOR  FURTHER  W/U  AND  TREATMENT (2018 13:08)      PAST MEDICAL & SURGICAL HISTORY:        MEDICATIONS  (STANDING):  amLODIPine   Tablet 10 milliGRAM(s) Oral daily  AQUAPHOR (petrolatum Ointment) 1 Application(s) Topical two times a day  cholecalciferol 400 Unit(s) Oral daily  cloNIDine 0.2 milliGRAM(s) Oral every 12 hours  metoprolol succinate ER 50 milliGRAM(s) Oral daily    MEDICATIONS  (PRN):  acetaminophen   Tablet. 650 milliGRAM(s) Oral every 6 hours PRN Mild Pain (1 - 3)      Allergies    No Known Allergies    Intolerances        SOCIAL HISTORY:      Vital Signs Last 24 Hrs  T(C): 36.9 (2018 12:12), Max: 37 (2018 01:02)  T(F): 98.5 (2018 12:12), Max: 98.6 (2018 01:02)  HR: 76 (2018 12:12) (70 - 88)  BP: 183/97 (2018 12:12) (157/87 - 190/98)  BP(mean): 9 (2018 16:24) (9 - 9)  RR: 18 (2018 12:12) (18 - 18)  SpO2: 98% (2018 12:12) (95% - 100%)    P/E:- SOB, difficulty lying down supine, needs pillows, no scar in the neck , No pacemaker,  CAROTIDS:- Bilateral carotids with no Bruits. No scars of previous catheterisation.  UPPER EXTREMITIES:- Bilateral radial artery pulses are normal and no ischemia of the Hands. No edema of the arms.  ABDOMEN:- No pulsatile mass in the abdomen and no ascites.  LOWER EXTREMITIES:- Bilateral LE with  Edema and no CVI, No varicose veins, no ulcers.     LABS:                        7.7    12.87 )-----------( 147      ( 2018 06:32 )             23.9     04-    142  |  110<H>  |  151  ----------------------------<  123<H>  7.0<HH>   |  17<L>  |  13.70<H>    Ca    7.2<L>      2018 06:32  Phos  8.8     -  Mg     1.4         TPro  6.7  /  Alb  3.0<L>  /  TBili  0.7  /  DBili  x   /  AST  23  /  ALT  58  /  AlkPhos  66  -      Urinalysis Basic - ( 2018 12:26 )    Color: Yellow / Appearance: Clear / S.010 / pH: x  Gluc: x / Ketone: Negative  / Bili: Negative / Urobili: Negative mg/dL   Blood: x / Protein: 500 mg/dL / Nitrite: Negative   Leuk Esterase: Negative / RBC: 3-5 /HPF / WBC 3-5   Sq Epi: x / Non Sq Epi: Few / Bacteria: Few        RADIOLOGY & ADDITIONAL STUDIES    Impression and Plan: Labs noted, pt has K of 7 and HB is low, will place emergent cath in ig AND THEN PLACE pERMACATH NEXT WEEK AND PLACE avf PRIOR TO d/c.

## 2018-04-22 LAB
ALBUMIN SERPL ELPH-MCNC: 2.7 G/DL — LOW (ref 3.3–5)
ALP SERPL-CCNC: 63 U/L — SIGNIFICANT CHANGE UP (ref 40–120)
ALT FLD-CCNC: 61 U/L — SIGNIFICANT CHANGE UP (ref 12–78)
ANION GAP SERPL CALC-SCNC: 13 MMOL/L — SIGNIFICANT CHANGE UP (ref 5–17)
AST SERPL-CCNC: 26 U/L — SIGNIFICANT CHANGE UP (ref 15–37)
BILIRUB SERPL-MCNC: 0.8 MG/DL — SIGNIFICANT CHANGE UP (ref 0.2–1.2)
BUN SERPL-MCNC: 98 MG/DL — HIGH (ref 7–23)
CALCIUM SERPL-MCNC: 7.3 MG/DL — LOW (ref 8.5–10.1)
CHLORIDE SERPL-SCNC: 101 MMOL/L — SIGNIFICANT CHANGE UP (ref 96–108)
CO2 SERPL-SCNC: 23 MMOL/L — SIGNIFICANT CHANGE UP (ref 22–31)
CREAT SERPL-MCNC: 9.91 MG/DL — HIGH (ref 0.5–1.3)
GLUCOSE SERPL-MCNC: 143 MG/DL — HIGH (ref 70–99)
HCT VFR BLD CALC: 20.6 % — CRITICAL LOW (ref 39–50)
HGB BLD-MCNC: 6.8 G/DL — CRITICAL LOW (ref 13–17)
MCHC RBC-ENTMCNC: 27.3 PG — SIGNIFICANT CHANGE UP (ref 27–34)
MCHC RBC-ENTMCNC: 33 GM/DL — SIGNIFICANT CHANGE UP (ref 32–36)
MCV RBC AUTO: 82.7 FL — SIGNIFICANT CHANGE UP (ref 80–100)
PLATELET # BLD AUTO: 115 K/UL — LOW (ref 150–400)
POTASSIUM SERPL-MCNC: 5.3 MMOL/L — SIGNIFICANT CHANGE UP (ref 3.5–5.3)
POTASSIUM SERPL-SCNC: 5.3 MMOL/L — SIGNIFICANT CHANGE UP (ref 3.5–5.3)
PROT SERPL-MCNC: 5.9 GM/DL — LOW (ref 6–8.3)
RBC # BLD: 2.49 M/UL — LOW (ref 4.2–5.8)
RBC # FLD: 15.9 % — HIGH (ref 10.3–14.5)
SODIUM SERPL-SCNC: 137 MMOL/L — SIGNIFICANT CHANGE UP (ref 135–145)
WBC # BLD: 11.92 K/UL — HIGH (ref 3.8–10.5)
WBC # FLD AUTO: 11.92 K/UL — HIGH (ref 3.8–10.5)

## 2018-04-22 PROCEDURE — 71045 X-RAY EXAM CHEST 1 VIEW: CPT | Mod: 26

## 2018-04-22 PROCEDURE — 73560 X-RAY EXAM OF KNEE 1 OR 2: CPT | Mod: 26,RT

## 2018-04-22 RX ORDER — MORPHINE SULFATE 50 MG/1
2 CAPSULE, EXTENDED RELEASE ORAL ONCE
Qty: 0 | Refills: 0 | Status: DISCONTINUED | OUTPATIENT
Start: 2018-04-22 | End: 2018-04-22

## 2018-04-22 RX ORDER — OXYCODONE AND ACETAMINOPHEN 5; 325 MG/1; MG/1
1 TABLET ORAL ONCE
Qty: 0 | Refills: 0 | Status: DISCONTINUED | OUTPATIENT
Start: 2018-04-22 | End: 2018-04-22

## 2018-04-22 RX ORDER — LISINOPRIL 2.5 MG/1
20 TABLET ORAL DAILY
Qty: 0 | Refills: 0 | Status: DISCONTINUED | OUTPATIENT
Start: 2018-04-22 | End: 2018-04-24

## 2018-04-22 RX ORDER — CARVEDILOL PHOSPHATE 80 MG/1
12.5 CAPSULE, EXTENDED RELEASE ORAL EVERY 12 HOURS
Qty: 0 | Refills: 0 | Status: DISCONTINUED | OUTPATIENT
Start: 2018-04-22 | End: 2018-04-30

## 2018-04-22 RX ORDER — ACETAMINOPHEN 500 MG
650 TABLET ORAL EVERY 6 HOURS
Qty: 0 | Refills: 0 | Status: DISCONTINUED | OUTPATIENT
Start: 2018-04-22 | End: 2018-04-30

## 2018-04-22 RX ORDER — LABETALOL HCL 100 MG
10 TABLET ORAL ONCE
Qty: 0 | Refills: 0 | Status: COMPLETED | OUTPATIENT
Start: 2018-04-22 | End: 2018-04-22

## 2018-04-22 RX ADMIN — OXYCODONE AND ACETAMINOPHEN 1 TABLET(S): 5; 325 TABLET ORAL at 20:22

## 2018-04-22 RX ADMIN — OXYCODONE AND ACETAMINOPHEN 1 TABLET(S): 5; 325 TABLET ORAL at 07:30

## 2018-04-22 RX ADMIN — OXYCODONE AND ACETAMINOPHEN 1 TABLET(S): 5; 325 TABLET ORAL at 17:30

## 2018-04-22 RX ADMIN — Medication 10 MILLIGRAM(S): at 09:50

## 2018-04-22 RX ADMIN — Medication 0.2 MILLIGRAM(S): at 05:59

## 2018-04-22 RX ADMIN — OXYCODONE AND ACETAMINOPHEN 1 TABLET(S): 5; 325 TABLET ORAL at 06:43

## 2018-04-22 RX ADMIN — OXYCODONE AND ACETAMINOPHEN 1 TABLET(S): 5; 325 TABLET ORAL at 00:30

## 2018-04-22 RX ADMIN — OXYCODONE AND ACETAMINOPHEN 1 TABLET(S): 5; 325 TABLET ORAL at 18:30

## 2018-04-22 RX ADMIN — Medication 0.2 MILLIGRAM(S): at 22:04

## 2018-04-22 RX ADMIN — LISINOPRIL 20 MILLIGRAM(S): 2.5 TABLET ORAL at 19:22

## 2018-04-22 RX ADMIN — Medication 1 APPLICATION(S): at 22:04

## 2018-04-22 RX ADMIN — Medication 50 MILLIGRAM(S): at 05:58

## 2018-04-22 RX ADMIN — OXYCODONE AND ACETAMINOPHEN 1 TABLET(S): 5; 325 TABLET ORAL at 10:40

## 2018-04-22 RX ADMIN — CARVEDILOL PHOSPHATE 12.5 MILLIGRAM(S): 80 CAPSULE, EXTENDED RELEASE ORAL at 17:30

## 2018-04-22 RX ADMIN — AMLODIPINE BESYLATE 10 MILLIGRAM(S): 2.5 TABLET ORAL at 05:59

## 2018-04-22 RX ADMIN — Medication 400 UNIT(S): at 17:33

## 2018-04-22 RX ADMIN — OXYCODONE AND ACETAMINOPHEN 1 TABLET(S): 5; 325 TABLET ORAL at 11:40

## 2018-04-22 RX ADMIN — Medication 10 MILLIGRAM(S): at 01:51

## 2018-04-22 RX ADMIN — Medication 1 APPLICATION(S): at 06:54

## 2018-04-22 RX ADMIN — OXYCODONE AND ACETAMINOPHEN 1 TABLET(S): 5; 325 TABLET ORAL at 19:22

## 2018-04-22 NOTE — PROGRESS NOTE ADULT - SUBJECTIVE AND OBJECTIVE BOX
Assessment:    Renal failure,  ESRD, Nephrology noted, needed to make necessary arrangements for long term HD  SOB AND symptomatology primarily renal  Patient states had stress test and CV w/u a few months ago for renal transplant consideration  All negative  Tolerating HD

## 2018-04-22 NOTE — PROGRESS NOTE ADULT - SUBJECTIVE AND OBJECTIVE BOX
INTERVAL HPI/OVERNIGHT EVENTS:    had HD  session yesterday  tolerated  well     REVIEW OF SYSTEMS:  CONSTITUTIONAL:  no  complaints    NECK: No pain or stiffnes  RESPIRATORY: No SOB   CARDIOVASCULAR: No chest pain, palpitations, dizziness,   GASTROINTESTINAL: No abdominal pain. No nausea, vomiting,   NEUROLOGICAL: No headaches, no  blurry  vision no  dizziness  SKIN: No itching,   MUSCULOSKELETAL: No pain    MEDICATION:  acetaminophen   Tablet. 650 milliGRAM(s) Oral every 6 hours PRN  amLODIPine   Tablet 10 milliGRAM(s) Oral daily  AQUAPHOR (petrolatum Ointment) 1 Application(s) Topical two times a day  cholecalciferol 400 Unit(s) Oral daily  cloNIDine 0.2 milliGRAM(s) Oral every 12 hours  metoprolol succinate ER 50 milliGRAM(s) Oral daily    Vital Signs Last 24 Hrs  T(C): 37 (2018 08:40), Max: 37.1 (2018 18:35)  T(F): 98.6 (2018 08:40), Max: 98.8 (2018 18:35)  HR: 89 (2018 08:23) (76 - 98)  BP: 180/100 (2018 10:47) (180/100 - 212/98)  BP(mean): --  RR: 18 (2018 06:08) (18 - 20)  SpO2: 98% (2018 08:23) (98% - 99%)    PHYSICAL EXAM:  GENERAL: NAD, well-groomed, well-developed  EYES:  conjunctiva and sclera clear  ENMT:  Moist mucous membranes,   NECK: Supple, No JVD, Normal thyroid  NERVOUS SYSTEM:  Alert oriented   no  focal  deficits;   CHEST/LUNG: Clear    HEART: Regular rate and rhythm; No murmurs, rubs, or gallops  ABDOMEN: Soft, Nontender, Nondistended; Bowel sounds present  EXTREMITIES:  no  edema no  tenderness  SKIN: No rashes   LABS:                        7.7    12.87 )-----------( 147      ( 2018 06:32 )             23.9     04-21    x   |  x   |  x   ----------------------------<  x   4.9   |  x   |  x     Ca    7.2<L>      2018 06:32  Phos  8.8     04-21    TPro  6.7  /  Alb  3.0<L>  /  TBili  0.7  /  DBili  x   /  AST  23  /  ALT  58  /  AlkPhos  66        Urinalysis Basic - ( 2018 12:26 )    Color: Yellow / Appearance: Clear / S.010 / pH: x  Gluc: x / Ketone: Negative  / Bili: Negative / Urobili: Negative mg/dL   Blood: x / Protein: 500 mg/dL / Nitrite: Negative   Leuk Esterase: Negative / RBC: 3-5 /HPF / WBC 3-5   Sq Epi: x / Non Sq Epi: Few / Bacteria: Few      CAPILLARY BLOOD GLUCOSE          RADIOLOGY & ADDITIONAL TESTS:    Imaging reports  Personally Reviewed:  [ x] YES  [ ] NO    Consultant(s) Notes Reviewed:  [x ] YES  [ ] NO    Care Discussed with Consultants/Other Providers [ x] YES  [ ] NO  Assessment and Plan:   Problem/Plan - 1:  ·  Problem: Shortness of breath.  Plan: multi factorial  CKD  anemia  fluif  overload     Problem/Plan - 2:  ·  Problem: ESRF (end stage renal failure).  Plan: HD  as  per  nephrology.     Problem/Plan - 3:  ·  Problem: Anemia.  Plan: transfuse  as  needed.     Problem/Plan - 4:  ·  Problem: Hyperkalemia, diminished renal excretion.  Plan: insulin  calcium.     Problem/Plan - 5:  ·  Problem: HTN (hypertension).  Plan: norvasc.   clonidinincrease  metoprolol

## 2018-04-22 NOTE — PROGRESS NOTE ADULT - SUBJECTIVE AND OBJECTIVE BOX
INTERVAL HPI/OVERNIGHT EVENTS:  s/p dialysis yesterday and feels well. o2 sats 98%    Vital Signs Last 24 Hrs  T(C): 38.1 (22 Apr 2018 16:55), Max: 38.1 (22 Apr 2018 16:55)  T(F): 100.5 (22 Apr 2018 16:55), Max: 100.5 (22 Apr 2018 16:55)  HR: 88 (22 Apr 2018 18:30) (87 - 98)  BP: 204/103 (22 Apr 2018 15:58) (169/97 - 212/98)  BP(mean): --  RR: 18 (22 Apr 2018 15:58) (16 - 20)  SpO2: 98% (22 Apr 2018 18:30) (98% - 99%)        PHYSICAL EXAM:  GEN:         Awake, responsive and comfortable.  HEENT:    Normal.    RESP:     clear bilat  CVS:             Regular rate and rhythm.   ABD:         Soft, non-tender, non-distended;   :             No costovertebral angle tenderness  EXTR:            No clubbing, cyanosis or edema  CNS:              Intact sensory and motor function.        MEDICATIONS  (STANDING):  amLODIPine   Tablet 10 milliGRAM(s) Oral daily  AQUAPHOR (petrolatum Ointment) 1 Application(s) Topical two times a day  carvedilol 12.5 milliGRAM(s) Oral every 12 hours  cholecalciferol 400 Unit(s) Oral daily  cloNIDine 0.2 milliGRAM(s) Oral every 8 hours  lisinopril 20 milliGRAM(s) Oral daily    MEDICATIONS  (PRN):  acetaminophen   Tablet 650 milliGRAM(s) Oral every 6 hours PRN For Temp greater than 38 C (100.4 F)  acetaminophen   Tablet. 650 milliGRAM(s) Oral every 6 hours PRN Mild Pain (1 - 3)        LABS:                        7.7    12.87 )-----------( 147      ( 21 Apr 2018 06:32 )             23.9     04-21    x   |  x   |  x   ----------------------------<  x   4.9   |  x   |  x     Ca    7.2<L>      21 Apr 2018 06:32  Phos  8.8     04-21    TPro  6.7  /  Alb  3.0<L>  /  TBili  0.7  /  DBili  x   /  AST  23  /  ALT  58  /  AlkPhos  66  04-21              RADIOLOGY & ADDITIONAL STUDIES:  < from: Xray Chest 1 View- PORTABLE-Routine (04.22.18 @ 11:55) >    EXAM:  XR CHEST PORTABLE ROUTINE 1V                            PROCEDURE DATE:  04/22/2018          INTERPRETATION:  Clinical history: 57-year-old male, shortness of breath.    Two views are compared to 4/21/2018 and demonstrate a normal cardiac   silhouette and normal pulmonary vasculature with no pneumothorax or   effusions.    A probable small retrocardiac infiltrate is noted. Bibasilar opacities   have improved    Osseous structures are unremarkable.    Impression    Probable residual retrocardiac infiltrate, recommend correlation with   physical findings                MIKALA GONZALEZ M.D., ATTENDING RADIOLOGIST  This document has been electronically signed. Apr 22 2018  6:07PM        < end of copied text >    ASSESSMENT: r/o retrocardiac process.  PLAN: cbc and cxr pa/lat tommorrow.

## 2018-04-22 NOTE — PROGRESS NOTE ADULT - SUBJECTIVE AND OBJECTIVE BOX
Patient feels well no complaints today. Tolerated HD well ; refusing blood work today;   Right knee pain     MEDICATIONS  (STANDING):  amLODIPine   Tablet 10 milliGRAM(s) Oral daily  AQUAPHOR (petrolatum Ointment) 1 Application(s) Topical two times a day  carvedilol 12.5 milliGRAM(s) Oral every 12 hours  cholecalciferol 400 Unit(s) Oral daily  cloNIDine 0.2 milliGRAM(s) Oral every 8 hours    MEDICATIONS  (PRN):  acetaminophen   Tablet. 650 milliGRAM(s) Oral every 6 hours PRN Mild Pain (1 - 3)      04-21-18 @ 07:01  -  04-22-18 @ 07:00  --------------------------------------------------------  IN: 360 mL / OUT: 250 mL / NET: 110 mL      PHYSICAL EXAM:      T(C): 37.7 (04-22-18 @ 15:58), Max: 37.7 (04-22-18 @ 15:58)  HR: 88 (04-22-18 @ 15:58) (87 - 98)  BP: 204/103 (04-22-18 @ 15:58) (169/97 - 212/98)  RR: 18 (04-22-18 @ 15:58) (16 - 20)  SpO2: 98% (04-22-18 @ 15:58) (98% - 99%)  Wt(kg): --  Respiratory: clear anteriorly, decreased BS at bases  Cardiovascular: S1 S2  Gastrointestinal: soft NT ND +BS  Extremities:   1edema right knee mild tenderness                                    7.7    12.87 )-----------( 147      ( 21 Apr 2018 06:32 )             23.9     04-21    x   |  x   |  x   ----------------------------<  x   4.9   |  x   |  x     Ca    7.2<L>      21 Apr 2018 06:32  Phos  8.8     04-21    TPro  6.7  /  Alb  3.0<L>  /  TBili  0.7  /  DBili  x   /  AST  23  /  ALT  58  /  AlkPhos  66  04-21      LIVER FUNCTIONS - ( 21 Apr 2018 06:32 )  Alb: 3.0 g/dL / Pro: 6.7 gm/dL / ALK PHOS: 66 U/L / ALT: 58 U/L / AST: 23 U/L / GGT: x             Assessment and Plan:    ESRD:, HD tomorrow;   BLood pressure medication adjustments;   HD for tomorrow;   Perm cath and outpatient placement.  Will follow.

## 2018-04-23 DIAGNOSIS — R69 ILLNESS, UNSPECIFIED: ICD-10-CM

## 2018-04-23 LAB
% ALBUMIN: 56.5 % — SIGNIFICANT CHANGE UP
% ALPHA 1: 7.6 % — SIGNIFICANT CHANGE UP
% ALPHA 2: 16.8 % — SIGNIFICANT CHANGE UP
% BETA: 11 % — SIGNIFICANT CHANGE UP
% GAMMA: 8.1 % — SIGNIFICANT CHANGE UP
ALBUMIN SERPL ELPH-MCNC: 3.3 G/DL — LOW (ref 3.6–5.5)
ALBUMIN/GLOB SERPL ELPH: 1.3 RATIO — SIGNIFICANT CHANGE UP
ALPHA1 GLOB SERPL ELPH-MCNC: 0.4 G/DL — SIGNIFICANT CHANGE UP (ref 0.1–0.4)
ALPHA2 GLOB SERPL ELPH-MCNC: 1 G/DL — SIGNIFICANT CHANGE UP (ref 0.5–1)
ANISOCYTOSIS BLD QL: SIGNIFICANT CHANGE UP
APPEARANCE UR: CLEAR — SIGNIFICANT CHANGE UP
B-GLOBULIN SERPL ELPH-MCNC: 0.6 G/DL — SIGNIFICANT CHANGE UP (ref 0.5–1)
BACTERIA # UR AUTO: ABNORMAL
BASOPHILS # BLD AUTO: 0.01 K/UL — SIGNIFICANT CHANGE UP (ref 0–0.2)
BASOPHILS NFR BLD AUTO: 0.1 % — SIGNIFICANT CHANGE UP (ref 0–2)
BILIRUB UR-MCNC: NEGATIVE — SIGNIFICANT CHANGE UP
COLOR SPEC: YELLOW — SIGNIFICANT CHANGE UP
DIFF PNL FLD: ABNORMAL
EOSINOPHIL # BLD AUTO: 0.04 K/UL — SIGNIFICANT CHANGE UP (ref 0–0.5)
EOSINOPHIL NFR BLD AUTO: 0.3 % — SIGNIFICANT CHANGE UP (ref 0–6)
GAMMA GLOBULIN: 0.5 G/DL — LOW (ref 0.6–1.6)
GLUCOSE UR QL: 50 MG/DL
HBV E AB SER-ACNC: NEGATIVE — SIGNIFICANT CHANGE UP
HCT VFR BLD CALC: 19 % — CRITICAL LOW (ref 39–50)
HGB BLD-MCNC: 6.2 G/DL — CRITICAL LOW (ref 13–17)
IGA FLD-MCNC: 101 MG/DL — SIGNIFICANT CHANGE UP (ref 68–378)
IGG FLD-MCNC: 468 MG/DL — LOW (ref 694–1618)
IGM SERPL-MCNC: 28 MG/DL — LOW (ref 40–230)
IMM GRANULOCYTES NFR BLD AUTO: 0.8 % — SIGNIFICANT CHANGE UP (ref 0–1.5)
INTERPRETATION SERPL IFE-IMP: SIGNIFICANT CHANGE UP
KAPPA LC SER QL IFE: 5.2 MG/DL — HIGH (ref 0.33–1.94)
KAPPA/LAMBDA FREE LIGHT CHAIN RATIO, SERUM: 1.91 RATIO — HIGH (ref 0.26–1.65)
KETONES UR-MCNC: NEGATIVE — SIGNIFICANT CHANGE UP
LACTATE SERPL-SCNC: 0.9 MMOL/L — SIGNIFICANT CHANGE UP (ref 0.7–2)
LAMBDA LC SER QL IFE: 2.72 MG/DL — HIGH (ref 0.57–2.63)
LEUKOCYTE ESTERASE UR-ACNC: NEGATIVE — SIGNIFICANT CHANGE UP
LYMPHOCYTES # BLD AUTO: 0.4 K/UL — LOW (ref 1–3.3)
LYMPHOCYTES # BLD AUTO: 3.4 % — LOW (ref 13–44)
MANUAL SMEAR VERIFICATION: SIGNIFICANT CHANGE UP
MCHC RBC-ENTMCNC: 27.1 PG — SIGNIFICANT CHANGE UP (ref 27–34)
MCHC RBC-ENTMCNC: 32.6 GM/DL — SIGNIFICANT CHANGE UP (ref 32–36)
MCV RBC AUTO: 83 FL — SIGNIFICANT CHANGE UP (ref 80–100)
MICROCYTES BLD QL: SIGNIFICANT CHANGE UP
MONOCYTES # BLD AUTO: 1.14 K/UL — HIGH (ref 0–0.9)
MONOCYTES NFR BLD AUTO: 9.6 % — SIGNIFICANT CHANGE UP (ref 2–14)
NEUTROPHILS # BLD AUTO: 10.24 K/UL — HIGH (ref 1.8–7.4)
NEUTROPHILS NFR BLD AUTO: 85.8 % — HIGH (ref 43–77)
NITRITE UR-MCNC: NEGATIVE — SIGNIFICANT CHANGE UP
NRBC # BLD: 0 /100 WBCS — SIGNIFICANT CHANGE UP (ref 0–0)
NRBC # BLD: 0 /100 WBCS — SIGNIFICANT CHANGE UP (ref 0–0)
PH UR: 6 — SIGNIFICANT CHANGE UP (ref 5–8)
PLAT MORPH BLD: NORMAL — SIGNIFICANT CHANGE UP
PLATELET # BLD AUTO: 92 K/UL — LOW (ref 150–400)
PROT PATTERN SERPL ELPH-IMP: SIGNIFICANT CHANGE UP
PROT SERPL-MCNC: 5.9 G/DL — LOW (ref 6–8.3)
PROT UR-MCNC: 500 MG/DL
RBC # BLD: 2.29 M/UL — LOW (ref 4.2–5.8)
RBC # FLD: 15.4 % — HIGH (ref 10.3–14.5)
RBC BLD AUTO: ABNORMAL
RBC CASTS # UR COMP ASSIST: ABNORMAL /HPF (ref 0–4)
SCHISTOCYTES BLD QL AUTO: SLIGHT — SIGNIFICANT CHANGE UP
SP GR SPEC: 1.01 — SIGNIFICANT CHANGE UP (ref 1.01–1.02)
UROBILINOGEN FLD QL: NEGATIVE MG/DL — SIGNIFICANT CHANGE UP
WBC # BLD: 10.13 K/UL — SIGNIFICANT CHANGE UP (ref 3.8–10.5)
WBC # FLD AUTO: 10.13 K/UL — SIGNIFICANT CHANGE UP (ref 3.8–10.5)
WBC UR QL: SIGNIFICANT CHANGE UP

## 2018-04-23 RX ADMIN — AMLODIPINE BESYLATE 10 MILLIGRAM(S): 2.5 TABLET ORAL at 06:32

## 2018-04-23 RX ADMIN — Medication 0.2 MILLIGRAM(S): at 22:18

## 2018-04-23 RX ADMIN — LISINOPRIL 20 MILLIGRAM(S): 2.5 TABLET ORAL at 06:32

## 2018-04-23 RX ADMIN — CARVEDILOL PHOSPHATE 12.5 MILLIGRAM(S): 80 CAPSULE, EXTENDED RELEASE ORAL at 18:33

## 2018-04-23 RX ADMIN — Medication 400 UNIT(S): at 11:22

## 2018-04-23 RX ADMIN — Medication 1 APPLICATION(S): at 18:33

## 2018-04-23 RX ADMIN — Medication 0.2 MILLIGRAM(S): at 06:33

## 2018-04-23 RX ADMIN — CARVEDILOL PHOSPHATE 12.5 MILLIGRAM(S): 80 CAPSULE, EXTENDED RELEASE ORAL at 06:32

## 2018-04-23 RX ADMIN — Medication 20 MILLIGRAM(S): at 22:17

## 2018-04-23 RX ADMIN — Medication 1 APPLICATION(S): at 06:48

## 2018-04-23 NOTE — PROGRESS NOTE ADULT - SUBJECTIVE AND OBJECTIVE BOX
Patient feels well no complaints today. Feels better    MEDICATIONS  (STANDING):  amLODIPine   Tablet 10 milliGRAM(s) Oral daily  AQUAPHOR (petrolatum Ointment) 1 Application(s) Topical two times a day  carvedilol 12.5 milliGRAM(s) Oral every 12 hours  cholecalciferol 400 Unit(s) Oral daily  cloNIDine 0.2 milliGRAM(s) Oral every 8 hours  lisinopril 20 milliGRAM(s) Oral daily    MEDICATIONS  (PRN):  acetaminophen   Tablet 650 milliGRAM(s) Oral every 6 hours PRN For Temp greater than 38 C (100.4 F)  acetaminophen   Tablet. 650 milliGRAM(s) Oral every 6 hours PRN Mild Pain (1 - 3)      04-22-18 @ 07:01  -  04-23-18 @ 07:00  --------------------------------------------------------  IN: 360 mL / OUT: 200 mL / NET: 160 mL    04-23-18 @ 07:01  -  04-23-18 @ 11:12  --------------------------------------------------------  IN: 240 mL / OUT: 250 mL / NET: -10 mL      PHYSICAL EXAM:      T(C): 37.1 (04-23-18 @ 05:41), Max: 38.1 (04-22-18 @ 16:55)  HR: 70 (04-23-18 @ 10:16) (68 - 88)  BP: 175/98 (04-23-18 @ 05:41) (165/96 - 204/103)  RR: 17 (04-23-18 @ 05:41) (16 - 18)  SpO2: 98% (04-23-18 @ 10:16) (97% - 99%)  Wt(kg): --  Respiratory: clear anteriorly, decreased BS at bases  Cardiovascular: S1 S2  Gastrointestinal: soft NT ND +BS  Extremities:   1 edema                                    6.8    11.92 )-----------( 115      ( 22 Apr 2018 23:27 )             20.6     04-22    137  |  101  |  98<H>  ----------------------------<  143<H>  5.3   |  23  |  9.91<H>    Ca    7.3<L>      22 Apr 2018 23:27    TPro  5.9<L>  /  Alb  2.7<L>  /  TBili  0.8  /  DBili  x   /  AST  26  /  ALT  61  /  AlkPhos  63  04-22      LIVER FUNCTIONS - ( 22 Apr 2018 23:27 )  Alb: 2.7 g/dL / Pro: 5.9 gm/dL / ALK PHOS: 63 U/L / ALT: 61 U/L / AST: 26 U/L / GGT: x             Assessment and Plan:  ESRD; for HD today  UF as tolerated;   MAP noted; medications adjustments;   Will need perm cath and placement. Patient feels well no complaints today. Feels better    MEDICATIONS  (STANDING):  amLODIPine   Tablet 10 milliGRAM(s) Oral daily  AQUAPHOR (petrolatum Ointment) 1 Application(s) Topical two times a day  carvedilol 12.5 milliGRAM(s) Oral every 12 hours  cholecalciferol 400 Unit(s) Oral daily  cloNIDine 0.2 milliGRAM(s) Oral every 8 hours  lisinopril 20 milliGRAM(s) Oral daily    MEDICATIONS  (PRN):  acetaminophen   Tablet 650 milliGRAM(s) Oral every 6 hours PRN For Temp greater than 38 C (100.4 F)  acetaminophen   Tablet. 650 milliGRAM(s) Oral every 6 hours PRN Mild Pain (1 - 3)      04-22-18 @ 07:01  -  04-23-18 @ 07:00  --------------------------------------------------------  IN: 360 mL / OUT: 200 mL / NET: 160 mL    04-23-18 @ 07:01  -  04-23-18 @ 11:12  --------------------------------------------------------  IN: 240 mL / OUT: 250 mL / NET: -10 mL      PHYSICAL EXAM:      T(C): 37.1 (04-23-18 @ 05:41), Max: 38.1 (04-22-18 @ 16:55)  HR: 70 (04-23-18 @ 10:16) (68 - 88)  BP: 175/98 (04-23-18 @ 05:41) (165/96 - 204/103)  RR: 17 (04-23-18 @ 05:41) (16 - 18)  SpO2: 98% (04-23-18 @ 10:16) (97% - 99%)  Wt(kg): --  Respiratory: clear anteriorly, decreased BS at bases  Cardiovascular: S1 S2  Gastrointestinal: soft NT ND +BS  Extremities:   1 edema                                    6.8    11.92 )-----------( 115      ( 22 Apr 2018 23:27 )             20.6     04-22    137  |  101  |  98<H>  ----------------------------<  143<H>  5.3   |  23  |  9.91<H>    Ca    7.3<L>      22 Apr 2018 23:27    TPro  5.9<L>  /  Alb  2.7<L>  /  TBili  0.8  /  DBili  x   /  AST  26  /  ALT  61  /  AlkPhos  63  04-22      LIVER FUNCTIONS - ( 22 Apr 2018 23:27 )  Alb: 2.7 g/dL / Pro: 5.9 gm/dL / ALK PHOS: 63 U/L / ALT: 61 U/L / AST: 26 U/L / GGT: x             Assessment and Plan:  ESRD; for HD today  UF as tolerated;   PRBC transfusion,  MAP noted; medications adjustments;   Will need perm cath and placement.

## 2018-04-23 NOTE — CHART NOTE - NSCHARTNOTEFT_GEN_A_CORE
Patient seen at bedside for removal of right femoral suzi per Dr. Carroll. Dressing removed, area cleaned with alcohol swab. Sutures removed. Entire length of catheter removed, tip intact. Pressure applied for approximately 10 minutes until hemostasis achieved. New dry, sterile dressing applied. Patient tolerated well.

## 2018-04-23 NOTE — PROGRESS NOTE ADULT - SUBJECTIVE AND OBJECTIVE BOX
Pt is tentatively scheduled for insertion of Perma cath on Tuesday.  To go to HD today. After HD, Right femoral Sergei Cath will be removed.

## 2018-04-23 NOTE — CONSULT NOTE ADULT - SUBJECTIVE AND OBJECTIVE BOX
Patient is a 57y old  Male who presents with a chief complaint of BEE  PLEURAL  EFFUSION  ANEMIA RENAL  FAILURE (20 Apr 2018 13:08)    HPI: PT  C/O  BEE  THIS  AM  WHILE  WALKING  IN  KITCHEN,  NO  CP  NO  PALPITATIONS - EVALUATED  IN  ER  FOUND  TO  HAVE  BILATERAL  PLEURAL  EFFUSIONS,  SEVERE  ANEMIA 6.4, ACUTE  ON  CHRONIC  RENAL  FAILURE, TROPONIN  ELEVATION - ADMITTED  FOR  FURTHER  W/U  AND  TREATMENT (20 Apr 2018 13:08)    Fell from bed - did not hit head.    Glen Case Marotta    Had HD today - feeling     REVIEW OF SYSTEMS as above  Constitutional - No fever, No weight loss, No fatigue  HEENT - No neck pain  Respiratory - No cough, No shortness of breath  Cardiovascular - No chest pain, No palpitations  Gastrointestinal - No abdominal pain, No nausea, No vomiting  Genitourinary - was planning on AV graft for HD at [facility] in near future.  Neurological - No headaches, No loss of strength, No numbness, No tremors  Skin - No lesions   Endocrine - No temperature intolerance  Musculoskeletal - No joint pain  Psychiatric - No depression, No anxiety    PAST MEDICAL & SURGICAL HISTORY  CKD - pending AV graft for HD in near future    SOCHX:    tobacco,  -  alcohol    FMHX: FA/MO  - contributory     ALLERGIES  No Known Allergies    MEDICATIONS reviewed  MEDICATIONS  (STANDING):  amLODIPine   Tablet 10 milliGRAM(s) Oral daily  AQUAPHOR (petrolatum Ointment) 1 Application(s) Topical two times a day  carvedilol 12.5 milliGRAM(s) Oral every 12 hours  cholecalciferol 400 Unit(s) Oral daily  cloNIDine 0.2 milliGRAM(s) Oral every 8 hours  lisinopril 20 milliGRAM(s) Oral daily    MEDICATIONS  (PRN):  acetaminophen   Tablet 650 milliGRAM(s) Oral every 6 hours PRN For Temp greater than 38 C (100.4 F)  acetaminophen   Tablet. 650 milliGRAM(s) Oral every 6 hours PRN Mild Pain (1 - 3)      --------------------------------------------------------------------  VITALS  T(C): 37 (04-23-18 @ 11:40), Max: 38.1 (04-22-18 @ 16:55)  HR: 76 (04-23-18 @ 11:40) (68 - 88)  BP: 146/82 (04-23-18 @ 11:40) (146/82 - 184/84)  RR: 17 (04-23-18 @ 11:40) (17 - 17)  SpO2: 100% (04-23-18 @ 11:40) (97% - 100%)  Wt(kg): -- 101.4 kg    PHYSICAL EXAM BMI 30.3  Constitutional - NAD, Comfortable  HEENT - NCAT, EOMI  Neck - Supple, functional ROM  Cardiovascular - RRR  Abdomen - Soft, Not tender  Extremities - Functional range of motion   Neurologic -                    Cognitive - Awake, Alert, Oriented     Speech Intact     Language  Intact     Motor - No focal deficits     Sensory - Intact to LT     Reflexes - DTR Intact, No primitive reflexive     Psychiatric - Mood stable, Affect WNL      RECENT LABS reviewed  CBC Full  -  ( 23 Apr 2018 13:06 )  WBC Count : 10.13 K/uL  Hemoglobin : 6.2 g/dL  Hematocrit : 19.0 %  Platelet Count - Automated : 92 K/uL  Mean Cell Volume : 83.0 fl  Mean Cell Hemoglobin : 27.1 pg  Mean Cell Hemoglobin Concentration : 32.6 gm/dL    04-22    137  |  101  |  98<H>  ----------------------------<  143<H>  5.3   |  23  |  9.91<H>    Ca    7.3<L>      22 Apr 2018 23:27    TPro  5.9<L>  /  Alb  2.7<L>  /  TBili  0.8  /  DBili  x   /  AST  26  /  ALT  61  /  AlkPhos  63  04-22    IMAGING reviewed:    FUNCTIONAL HISTORY    CURRENT FUNCTIONAL STATUS    IMPRESSION:     PLAN: Will follow - recommendations will depend upon clinical and functional course. Patient is a 57y old  Male who presents with a chief complaint of BEE  PLEURAL  EFFUSION  ANEMIA RENAL  FAILURE (20 Apr 2018 13:08)    HPI: PT  C/O  BEE  THIS  AM  WHILE  WALKING  IN  KITCHEN,  NO  CP  NO  PALPITATIONS - EVALUATED  IN  ER  FOUND  TO  HAVE  BILATERAL  PLEURAL  EFFUSIONS,  SEVERE  ANEMIA 6.4, ACUTE  ON  CHRONIC  RENAL  FAILURE, TROPONIN  ELEVATION - ADMITTED  FOR  FURTHER  W/U  AND  TREATMENT (20 Apr 2018 13:08)    Fell from bed - did not hit head, but hurt R knee, believes it was gout attack and took prednisone with improvement. No other specific complaints. s/p HD, tranfusion, feeling better.    Dr. White, Glen, Kartik    Had HD today - feeling better    REVIEW OF SYSTEMS as above  Constitutional - No fever, No weight loss, No fatigue  HEENT - No neck pain  Respiratory - shortness of breath improving  Cardiovascular - No chest pain, No palpitations  Gastrointestinal - No abdominal pain, No nausea, No vomiting  Genitourinary - was planning on AV graft for HD at [facility] in near future.  Neurological - No headaches, No loss of strength, No numbness, No tremors  Skin - No lesions   Endocrine - No temperature intolerance  Musculoskeletal - R knee pain improved with prednisone  Psychiatric - No depression, No anxiety    PAST MEDICAL & SURGICAL HISTORY  CKD - pending AV graft for HD in near future    SOCHX: Lives with daughter, house with 4 step entrance, rooms on 1st floor.  Ind mobility and ADLs  Occasional cigars, no alcohol abuse    FMHX: FA/MO  noncontributory     ALLERGIES  No Known Allergies    MEDICATIONS reviewed  MEDICATIONS  (STANDING):  amLODIPine   Tablet 10 milliGRAM(s) Oral daily  AQUAPHOR (petrolatum Ointment) 1 Application(s) Topical two times a day  carvedilol 12.5 milliGRAM(s) Oral every 12 hours  cholecalciferol 400 Unit(s) Oral daily  cloNIDine 0.2 milliGRAM(s) Oral every 8 hours  lisinopril 20 milliGRAM(s) Oral daily    MEDICATIONS  (PRN):  acetaminophen   Tablet 650 milliGRAM(s) Oral every 6 hours PRN For Temp greater than 38 C (100.4 F)  acetaminophen   Tablet. 650 milliGRAM(s) Oral every 6 hours PRN Mild Pain (1 - 3)      --------------------------------------------------------------------  VITALS  T(C): 37 (04-23-18 @ 11:40), Max: 38.1 (04-22-18 @ 16:55)  HR: 76 (04-23-18 @ 11:40) (68 - 88)  BP: 146/82 (04-23-18 @ 11:40) (146/82 - 184/84)  RR: 17 (04-23-18 @ 11:40) (17 - 17)  SpO2: 100% (04-23-18 @ 11:40) (97% - 100%)  Wt(kg): -- 101.4 kg    PHYSICAL EXAM BMI 30.3  Constitutional - NAD, Comfortable on table  HEENT - NCAT, EOMI  Neck - Supple, functional ROM  Cardiovascular - RRR  Abdomen - Soft, Not tender  Extremities - Functional range of motion except R knee +effusion, mild warmth, tender, painful ROM - he states "much better"  Neurologic -                    Cognitive - Awake, Alert, Oriented     Speech Intact     Language  Intact     Motor - No focal deficits     Sensory - Grossly intact     Reflexes - DTR absent LEs     Psychiatric - Mood stable, Affect WNL      RECENT LABS reviewed  CBC Full  -  ( 23 Apr 2018 13:06 )  WBC Count : 10.13 K/uL  Hemoglobin : 6.2 g/dL  Hematocrit : 19.0 %  Platelet Count - Automated : 92 K/uL  Mean Cell Volume : 83.0 fl  Mean Cell Hemoglobin : 27.1 pg  Mean Cell Hemoglobin Concentration : 32.6 gm/dL    04-22    137  |  101  |  98<H>  ----------------------------<  143<H>  5.3   |  23  |  9.91<H>    Ca    7.3<L>      22 Apr 2018 23:27    TPro  5.9<L>  /  Alb  2.7<L>  /  TBili  0.8  /  DBili  x   /  AST  26  /  ALT  61  /  AlkPhos  63  04-22    IMAGING reviewed:   CXR - small pleural effusions    FUNCTIONAL HISTORY Ind mobility, ADLs without device.    CURRENT FUNCTIONAL STATUS to be determined    IMPRESSION: 57 M with possible gout R knee, obese BMI 30.3, HTN, ESRD sooner than expected, s/p emergent HD for K+ 7, transfusion for Hgb 6.4.    PLAN: Will follow - recommendations will depend upon clinical and functional course.

## 2018-04-23 NOTE — PROGRESS NOTE ADULT - SUBJECTIVE AND OBJECTIVE BOX
INTERVAL HPI/OVERNIGHT EVENTS:        REVIEW OF SYSTEMS:  CONSTITUTIONAL:  no  complaints    NECK: No pain or stiffnes  RESPIRATORY: No SOB   CARDIOVASCULAR: No chest pain, palpitations, dizziness,   GASTROINTESTINAL: No abdominal pain. No nausea, vomiting,   NEUROLOGICAL: No headaches, no  blurry  vision no  dizziness  SKIN: No itching,   MUSCULOSKELETAL: No pain    MEDICATION:  acetaminophen   Tablet 650 milliGRAM(s) Oral every 6 hours PRN  acetaminophen   Tablet. 650 milliGRAM(s) Oral every 6 hours PRN  amLODIPine   Tablet 10 milliGRAM(s) Oral daily  AQUAPHOR (petrolatum Ointment) 1 Application(s) Topical two times a day  carvedilol 12.5 milliGRAM(s) Oral every 12 hours  cholecalciferol 400 Unit(s) Oral daily  cloNIDine 0.2 milliGRAM(s) Oral every 8 hours  lisinopril 20 milliGRAM(s) Oral daily    Vital Signs Last 24 Hrs  T(C): 37.1 (23 Apr 2018 05:41), Max: 38.1 (22 Apr 2018 16:55)  T(F): 98.7 (23 Apr 2018 05:41), Max: 100.5 (22 Apr 2018 16:55)  HR: 69 (23 Apr 2018 05:41) (68 - 89)  BP: 175/98 (23 Apr 2018 05:41) (165/96 - 212/98)  BP(mean): --  RR: 17 (23 Apr 2018 05:41) (16 - 18)  SpO2: 97% (23 Apr 2018 05:41) (97% - 99%)    PHYSICAL EXAM:  GENERAL: NAD, well-groomed, well-developed  EYES:  conjunctiva and sclera clear  ENMT:  Moist mucous membranes,   NECK: Supple, No JVD, Normal thyroid  NERVOUS SYSTEM:  Alert oriented   no  focal  deficits;   CHEST/LUNG: Clear    HEART: Regular rate and rhythm; No murmurs, rubs, or gallops  ABDOMEN: Soft, Nontender, Nondistended; Bowel sounds present  EXTREMITIES:  no  edema no  tenderness  SKIN: No rashes   LABS:                        6.8    11.92 )-----------( 115      ( 22 Apr 2018 23:27 )             20.6     04-22    137  |  101  |  98<H>  ----------------------------<  143<H>  5.3   |  23  |  9.91<H>    Ca    7.3<L>      22 Apr 2018 23:27    TPro  5.9<L>  /  Alb  2.7<L>  /  TBili  0.8  /  DBili  x   /  AST  26  /  ALT  61  /  AlkPhos  63  04-22        CAPILLARY BLOOD GLUCOSE          RADIOLOGY & ADDITIONAL TESTS:    Imaging reports  Personally Reviewed:  [x ] YES  [ ] NO    Consultant(s) Notes Reviewed:  [x ] YES  [ ] NO    Care Discussed with Consultants/Other Providers [x ] YES  [ ] NO  Assessment and Plan:   Problem/Plan - 1:  ·  Problem: Shortness of breath.  Plan: multi factorial  CKD  anemia  fluif  overload     Problem/Plan - 2:  ·  Problem: ESRF (end stage renal failure).  Plan: HD  as  per  nephrology.     Problem/Plan - 3:  ·  Problem: Anemia.  Plan: transfuse  as  needed. no clinical  evidence of  acute bleed     Problem/Plan - 4:  ·  Problem: Hyperkalemia, diminished renal excretion.  Plan: insulin  calcium.     Problem/Plan - 5:  ·  Problem: HTN (hypertension).  Plan: norvasc.   clonidinie increase  metoprolol

## 2018-04-23 NOTE — PROGRESS NOTE ADULT - SUBJECTIVE AND OBJECTIVE BOX
Patient is a 57y old  Male who presents with a chief complaint of BEE  PLEURAL  EFFUSION  ANEMIA RENAL  FAILURE (20 Apr 2018 13:08)  Pt had suzi placed by me on staurday in the right femoral Will remove it today post HD. Pt has no c/o. The pt is seen in HD, the cath is functioning ok. No bleeding and no hematoma.    amLODIPine   Tablet 10  carvedilol 12.5  cloNIDine 0.2  lisinopril 20    Allergies    No Known Allergies    Intolerances        Vital Signs Last 24 Hrs  T(C): 37 (23 Apr 2018 11:40), Max: 38.1 (22 Apr 2018 16:55)  T(F): 98.6 (23 Apr 2018 11:40), Max: 100.5 (22 Apr 2018 16:55)  HR: 76 (23 Apr 2018 11:40) (68 - 88)  BP: 146/82 (23 Apr 2018 11:40) (146/82 - 204/103)  BP(mean): --  RR: 17 (23 Apr 2018 11:40) (17 - 18)  SpO2: 100% (23 Apr 2018 11:40) (97% - 100%)  I&O's Detail    22 Apr 2018 07:01  -  23 Apr 2018 07:00  --------------------------------------------------------  IN:    Oral Fluid: 360 mL  Total IN: 360 mL    OUT:    Voided: 200 mL  Total OUT: 200 mL    Total NET: 160 mL      23 Apr 2018 07:01  -  23 Apr 2018 13:25  --------------------------------------------------------  IN:    Oral Fluid: 240 mL  Total IN: 240 mL    OUT:    Voided: 250 mL  Total OUT: 250 mL    Total NET: -10 mL          Physical Exam: Pt is sable getting PRBCS , less SOB than staurday.  General: NAD, resting comfortably in bed  Pulmonary: Nonlabored breathing, no respiratory distress  Cardiovascular: NSR  Abdominal: soft, NT/ND  Extremities: WWP    LABS:                        6.8    11.92 )-----------( 115      ( 22 Apr 2018 23:27 )             20.6     04-22    137  |  101  |  98<H>  ----------------------------<  143<H>  5.3   |  23  |  9.91<H>    Ca    7.3<L>      22 Apr 2018 23:27    TPro  5.9<L>  /  Alb  2.7<L>  /  TBili  0.8  /  DBili  x   /  AST  26  /  ALT  61  /  AlkPhos  63  04-22      CAPILLARY BLOOD GLUCOSE        Radiology and Additional Studies:    Assessment and Plan: 57yMaleSHORTNESS OF BREATH, s/p right Fem suzi cath, will schedule the pt for placement of permacath tomoro and remove the suzi today post HD. The pt also needs AVF done, and will try to schedule that prior to the D/C.

## 2018-04-23 NOTE — PROGRESS NOTE ADULT - SUBJECTIVE AND OBJECTIVE BOX
Assessment:    Renal failure,  ESRD, Nephrology noted, needed to make necessary arrangements for long term HD  SOB AND symptomatology primarily renal  Patient states had stress test and CV w/u a few months ago for renal transplant consideration  All negative  Tolerating HD  Vasc noted

## 2018-04-24 DIAGNOSIS — D64.9 ANEMIA, UNSPECIFIED: ICD-10-CM

## 2018-04-24 DIAGNOSIS — B19.10 UNSPECIFIED VIRAL HEPATITIS B WITHOUT HEPATIC COMA: ICD-10-CM

## 2018-04-24 LAB
ALBUMIN SERPL ELPH-MCNC: 2.5 G/DL — LOW (ref 3.3–5)
ALP SERPL-CCNC: 62 U/L — SIGNIFICANT CHANGE UP (ref 40–120)
ALT FLD-CCNC: 46 U/L — SIGNIFICANT CHANGE UP (ref 12–78)
ANION GAP SERPL CALC-SCNC: 10 MMOL/L — SIGNIFICANT CHANGE UP (ref 5–17)
APTT BLD: 24.6 SEC — LOW (ref 27.5–37.4)
AST SERPL-CCNC: 18 U/L — SIGNIFICANT CHANGE UP (ref 15–37)
BILIRUB SERPL-MCNC: 0.7 MG/DL — SIGNIFICANT CHANGE UP (ref 0.2–1.2)
BLD GP AB SCN SERPL QL: SIGNIFICANT CHANGE UP
BUN SERPL-MCNC: 72 MG/DL — HIGH (ref 7–23)
CALCIUM SERPL-MCNC: 7.9 MG/DL — LOW (ref 8.5–10.1)
CHLORIDE SERPL-SCNC: 103 MMOL/L — SIGNIFICANT CHANGE UP (ref 96–108)
CO2 SERPL-SCNC: 27 MMOL/L — SIGNIFICANT CHANGE UP (ref 22–31)
CREAT SERPL-MCNC: 7.89 MG/DL — HIGH (ref 0.5–1.3)
CRP SERPL-MCNC: 12.3 MG/DL — HIGH (ref 0–0.4)
ERYTHROCYTE [SEDIMENTATION RATE] IN BLOOD: 89 MM/HR — HIGH (ref 0–20)
GLUCOSE SERPL-MCNC: 143 MG/DL — HIGH (ref 70–99)
HAV IGM SER-ACNC: SIGNIFICANT CHANGE UP
HBV CORE IGM SER-ACNC: SIGNIFICANT CHANGE UP
HBV SURFACE AB SER-ACNC: <3 MIU/ML — LOW
HBV SURFACE AG SER-ACNC: REACTIVE
HCT VFR BLD CALC: 24.2 % — LOW (ref 39–50)
HCV AB S/CO SERPL IA: 0.07 S/CO — SIGNIFICANT CHANGE UP
HCV AB SERPL-IMP: SIGNIFICANT CHANGE UP
HGB BLD-MCNC: 8.1 G/DL — LOW (ref 13–17)
INR BLD: 1.04 RATIO — SIGNIFICANT CHANGE UP (ref 0.88–1.16)
MCHC RBC-ENTMCNC: 27.6 PG — SIGNIFICANT CHANGE UP (ref 27–34)
MCHC RBC-ENTMCNC: 33.5 GM/DL — SIGNIFICANT CHANGE UP (ref 32–36)
MCV RBC AUTO: 82.6 FL — SIGNIFICANT CHANGE UP (ref 80–100)
NRBC # BLD: 0 /100 WBCS — SIGNIFICANT CHANGE UP (ref 0–0)
PLATELET # BLD AUTO: 98 K/UL — LOW (ref 150–400)
POTASSIUM SERPL-MCNC: 4.8 MMOL/L — SIGNIFICANT CHANGE UP (ref 3.5–5.3)
POTASSIUM SERPL-SCNC: 4.8 MMOL/L — SIGNIFICANT CHANGE UP (ref 3.5–5.3)
PROT ?TM UR-MCNC: 359 MG/DL — HIGH (ref 0–12)
PROT SERPL-MCNC: 6.1 GM/DL — SIGNIFICANT CHANGE UP (ref 6–8.3)
PROTHROM AB SERPL-ACNC: 11.3 SEC — SIGNIFICANT CHANGE UP (ref 9.8–12.7)
RBC # BLD: 2.93 M/UL — LOW (ref 4.2–5.8)
RBC # FLD: 15 % — HIGH (ref 10.3–14.5)
SODIUM SERPL-SCNC: 140 MMOL/L — SIGNIFICANT CHANGE UP (ref 135–145)
URATE SERPL-MCNC: 5.8 MG/DL — SIGNIFICANT CHANGE UP (ref 3.4–8.8)
WBC # BLD: 12.98 K/UL — HIGH (ref 3.8–10.5)
WBC # FLD AUTO: 12.98 K/UL — HIGH (ref 3.8–10.5)

## 2018-04-24 RX ADMIN — Medication 0.2 MILLIGRAM(S): at 22:40

## 2018-04-24 RX ADMIN — AMLODIPINE BESYLATE 10 MILLIGRAM(S): 2.5 TABLET ORAL at 06:10

## 2018-04-24 RX ADMIN — Medication 40 MILLIGRAM(S): at 14:14

## 2018-04-24 RX ADMIN — Medication 1 APPLICATION(S): at 06:11

## 2018-04-24 RX ADMIN — CARVEDILOL PHOSPHATE 12.5 MILLIGRAM(S): 80 CAPSULE, EXTENDED RELEASE ORAL at 06:10

## 2018-04-24 RX ADMIN — CARVEDILOL PHOSPHATE 12.5 MILLIGRAM(S): 80 CAPSULE, EXTENDED RELEASE ORAL at 17:39

## 2018-04-24 RX ADMIN — Medication 1 APPLICATION(S): at 18:28

## 2018-04-24 RX ADMIN — Medication 400 UNIT(S): at 14:14

## 2018-04-24 RX ADMIN — LISINOPRIL 20 MILLIGRAM(S): 2.5 TABLET ORAL at 06:10

## 2018-04-24 RX ADMIN — Medication 0.2 MILLIGRAM(S): at 06:10

## 2018-04-24 RX ADMIN — Medication 0.2 MILLIGRAM(S): at 14:15

## 2018-04-24 NOTE — PROGRESS NOTE ADULT - SUBJECTIVE AND OBJECTIVE BOX
INTERVAL HPI/OVERNIGHT EVENTS:  denies cough or sob. for vascular access this week    Vital Signs Last 24 Hrs  T(C): 37.4 (2018 17:16), Max: 37.4 (2018 17:16)  T(F): 99.3 (2018 17:16), Max: 99.3 (2018 17:16)  HR: 73 (2018 17:49) (70 - 97)  BP: 154/84 (2018 17:16) (130/79 - 156/87)  BP(mean): --  RR: 18 (2018 17:16) (17 - 18)  SpO2: 97% (2018 17:49) (96% - 100%)        PHYSICAL EXAM:  GEN:         Awake, responsive and comfortable.  HEENT:    Normal.    RESP:     mostly clear  CVS:             Regular rate and rhythm.   ABD:         Soft, non-tender, non-distended;   :             No costovertebral angle tenderness  EXTR:            right knee STS  CNS:              Intact sensory and motor function.        MEDICATIONS  (STANDING):  amLODIPine   Tablet 10 milliGRAM(s) Oral daily  AQUAPHOR (petrolatum Ointment) 1 Application(s) Topical two times a day  carvedilol 12.5 milliGRAM(s) Oral every 12 hours  cholecalciferol 400 Unit(s) Oral daily  cloNIDine 0.2 milliGRAM(s) Oral every 8 hours  predniSONE   Tablet 40 milliGRAM(s) Oral daily    MEDICATIONS  (PRN):  acetaminophen   Tablet 650 milliGRAM(s) Oral every 6 hours PRN For Temp greater than 38 C (100.4 F)  acetaminophen   Tablet. 650 milliGRAM(s) Oral every 6 hours PRN Mild Pain (1 - 3)        LABS:                        8.1    12.98 )-----------( 98       ( 2018 07:59 )             24.2     04-24    140  |  103  |  72<H>  ----------------------------<  143<H>  4.8   |  27  |  7.89<H>    Ca    7.9<L>      2018 07:59    TPro  6.1  /  Alb  2.5<L>  /  TBili  0.7  /  DBili  x   /  AST  18  /  ALT  46  /  AlkPhos  62  04-24    PT/INR - ( 2018 07:59 )   PT: 11.3 sec;   INR: 1.04 ratio         PTT - ( 2018 07:59 )  PTT:24.6 sec    Urinalysis Basic - ( 2018 20:31 )    Color: Yellow / Appearance: Clear / S.015 / pH: x  Gluc: x / Ketone: Negative  / Bili: Negative / Urobili: Negative mg/dL   Blood: x / Protein: 500 mg/dL / Nitrite: Negative   Leuk Esterase: Negative / RBC: 6-10 /HPF / WBC 3-5   Sq Epi: x / Non Sq Epi: x / Bacteria: Few          RADIOLOGY & ADDITIONAL STUDIES:    ASSESSMENT AND PLAN: ESRD, right knee pain and swelling. for vascular access

## 2018-04-24 NOTE — PROGRESS NOTE ADULT - SUBJECTIVE AND OBJECTIVE BOX
Patient feels well no complaints today. Refused perm cath;       MEDICATIONS  (STANDING):  amLODIPine   Tablet 10 milliGRAM(s) Oral daily  AQUAPHOR (petrolatum Ointment) 1 Application(s) Topical two times a day  carvedilol 12.5 milliGRAM(s) Oral every 12 hours  cholecalciferol 400 Unit(s) Oral daily  cloNIDine 0.2 milliGRAM(s) Oral every 8 hours  lisinopril 20 milliGRAM(s) Oral daily  predniSONE   Tablet 40 milliGRAM(s) Oral daily    MEDICATIONS  (PRN):  acetaminophen   Tablet 650 milliGRAM(s) Oral every 6 hours PRN For Temp greater than 38 C (100.4 F)  acetaminophen   Tablet. 650 milliGRAM(s) Oral every 6 hours PRN Mild Pain (1 - 3)      04-23-18 @ 07:01  -  04-24-18 @ 07:00  --------------------------------------------------------  IN: 600 mL / OUT: 1950 mL / NET: -1350 mL    04-24-18 @ 07:01  -  04-24-18 @ 16:05  --------------------------------------------------------  IN: 300 mL / OUT: 350 mL / NET: -50 mL      PHYSICAL EXAM:      T(C): 37.1 (04-24-18 @ 11:41), Max: 37.1 (04-24-18 @ 00:34)  HR: 72 (04-24-18 @ 11:41) (70 - 97)  BP: 152/86 (04-24-18 @ 11:41) (130/79 - 156/87)  RR: 17 (04-24-18 @ 11:41) (17 - 17)  SpO2: 96% (04-24-18 @ 11:41) (96% - 99%)  Wt(kg): --  Respiratory: clear anteriorly, decreased BS at bases  Cardiovascular: S1 S2  Gastrointestinal: soft NT ND +BS  Extremities:  1 edema                                    8.1    12.98 )-----------( 98       ( 24 Apr 2018 07:59 )             24.2     04-24    140  |  103  |  72<H>  ----------------------------<  143<H>  4.8   |  27  |  7.89<H>    Ca    7.9<L>      24 Apr 2018 07:59    TPro  6.1  /  Alb  2.5<L>  /  TBili  0.7  /  DBili  x   /  AST  18  /  ALT  46  /  AlkPhos  62  04-24      LIVER FUNCTIONS - ( 24 Apr 2018 07:59 )  Alb: 2.5 g/dL / Pro: 6.1 gm/dL / ALK PHOS: 62 U/L / ALT: 46 U/L / AST: 18 U/L / GGT: x             Assessment and Plan:  ESRD; refused perm cath;   After discussion regarding the risk of uremic complications and death without continuing maintenance hd upon discharge, he agree to perm cath and av fistula of done simultaneously.  Tentative for friday;

## 2018-04-24 NOTE — PROGRESS NOTE ADULT - SUBJECTIVE AND OBJECTIVE BOX
Patient is a 57y old  Male who presents with a chief complaint of BEE  PLEURAL  EFFUSION  ANEMIA RENAL  FAILURE (20 Apr 2018 13:08)    HPI: PT  C/O  BEE  THIS  AM  WHILE  WALKING  IN  KITCHEN,  NO  CP  NO  PALPITATIONS - EVALUATED  IN  ER  FOUND  TO  HAVE  BILATERAL  PLEURAL  EFFUSIONS,  SEVERE  ANEMIA 6.4, ACUTE  ON  CHRONIC  RENAL  FAILURE, TROPONIN  ELEVATION - ADMITTED  FOR  FURTHER  W/U  AND  TREATMENT (20 Apr 2018 13:08)    Fell from bed - did not hit head, but hurt R knee, believes it was gout attack and took prednisone with improvement. No other specific complaints. s/p HD, tranfusion, feeling better, breathing well. R knee pain subsiding - often calls PMD for prescription for prednisone as soon as he feels gout pain.    Dr. White, Kartik Carroll Reddy checking Hep viral load    Had HD today - feeling better - awaiting Permacath and AV shunt.    REVIEW OF SYSTEMS as above    PAST MEDICAL & SURGICAL HISTORY  CKD - pending AV graft for HD in near future    SOCHX: Lives with daughter, house with 4 step entrance, rooms on 1st floor.  Ind mobility and ADLs  Occasional cigars, no alcohol abuse    FMHX: FA/MO  noncontributory     ALLERGIES  No Known Allergies    MEDICATIONS reviewed    Wt(kg): -- 101.4 kg    PHYSICAL EXAM BMI 30.3  Constitutional - NAD, Comfortable in bed, O2 NC  Cardiovascular - RRR  Abdomen - Soft, Not tender  Extremities - Functional range of motion except R knee +effusion, mild warmth, tender, painful ROM - he states "much better"   Neurologic -                    Cognitive - Awake, Alert, Oriented X3     Speech Intact     Language  Intact     Motor - No focal deficits     Psychiatric - Mood stable, Affect WNL    RECENT LABS reviewed    IMAGING reviewed:   CXR - small pleural effusions    FUNCTIONAL HISTORY Ind mobility, ADLs without device.    CURRENT FUNCTIONAL STATUS to be determined    IMPRESSION: 57 M with possible gout R knee, obese BMI 30.3, HTN, ESRD sooner than expected, s/p emergent HD for K+ 7, transfusion for Hgb 6.4.    PLAN: Will follow - recommendations will depend upon clinical and functional course.

## 2018-04-24 NOTE — CONSULT NOTE ADULT - CONSULT REQUESTED DATE/TIME
20-Apr-2018 15:11
20-Apr-2018 22:05
21-Apr-2018 12:42
23-Apr-2018 16:26
24-Apr-2018 14:45
20-Apr-2018 16:33

## 2018-04-24 NOTE — PROGRESS NOTE ADULT - SUBJECTIVE AND OBJECTIVE BOX
Assessment:    Renal failure,  ESRD, Nephrology noted, needed to make necessary arrangements for long term HD  SOB AND symptomatology primarily renal  Patient states had stress test and CV w/u a few months ago for renal transplant consideration  All negative  Tolerating HD  Vasc noted  Perma cath and AV fistula for Friday

## 2018-04-24 NOTE — CONSULT NOTE ADULT - SUBJECTIVE AND OBJECTIVE BOX
Chief Complaint:  Patient is a 57y old  Male who presents with a chief complaint of BEE  PLEURAL  EFFUSION  ANEMIA RENAL  FAILURE (2018 13:08)      HPI:  PT  C/O  BEE  THIS  AM  WHILE  WALKING  IN  KITCHEN  NO  CP  NO  PALPITATIONS  EVALUATED  IN  ER  FOUND  TO  HAVE  BILATERAL  PLEURAL  EFFUSIONS  SEVERE  ANEMIA  ACUTE  ON  CHRONIC  RENAL  FAILURE TROPONIN  ELEVATION  ADMITTED  FOR  FURTHER  W/U  AND  TREATMENT (2018 13:08)  asked to see patient for hepatitis C ab +      PMH/PSH:PAST MEDICAL & SURGICAL HISTORY:  HTN, sleep apnea ,  e coli sepsis after prostate BX  CRF to start HD  Allergies:  No Known Allergies      Medications:  acetaminophen   Tablet 650 milliGRAM(s) Oral every 6 hours PRN  acetaminophen   Tablet. 650 milliGRAM(s) Oral every 6 hours PRN  amLODIPine   Tablet 10 milliGRAM(s) Oral daily  AQUAPHOR (petrolatum Ointment) 1 Application(s) Topical two times a day  carvedilol 12.5 milliGRAM(s) Oral every 12 hours  cholecalciferol 400 Unit(s) Oral daily  cloNIDine 0.2 milliGRAM(s) Oral every 8 hours  lisinopril 20 milliGRAM(s) Oral daily  predniSONE   Tablet 40 milliGRAM(s) Oral daily      Review of Systems:    General:  No weight loss, fevers, chills, night sweats, fatigue,   Eyes:  Good vision, no reported pain  ENT:  No sore throat, pain, runny nose, dysphagia  CV:  No pain, palpitations, hypo/hypertension  Resp:  No dyspnea, cough, tachypnea, wheezing  GI:  No pain, No nausea, No vomiting, No diarrhea, No constipation, No pruritis, No rectal bleeding, No tarry stools, No dysphagia,  :  No pain, bleeding, incontinence, nocturia  Muscle:  No pain, weakness  Breast:  No pain, abscess, mass, discharge  Neuro:  No weakness, tingling, memory problems  Psych:  No fatigue, insomnia, mood problems, depression  Endocrine:  No polyuria, polydipsia, cold/heat intolerance  Heme:  No petechiae, ecchymosis, easy bruisability  Skin:  No rash, tattoos, scars, edema    Relevant Family History:   FAMILY HISTORY:      Relevant Social History: Alcohol ( -) , Tobacco ( -) , Illicit drugs (- ) , no tattoos, no blood transfusions    Physical Exam:    Vital Signs:  Vital Signs Last 24 Hrs  T(C): 37.1 (2018 11:41), Max: 37.1 (2018 00:34)  T(F): 98.7 (2018 11:41), Max: 98.8 (2018 00:34)  HR: 72 (2018 11:41) (69 - 97)  BP: 152/86 (2018 11:41) (130/79 - 156/87)  BP(mean): --  RR: 17 (2018 11:41) (16 - 17)  SpO2: 96% (2018 11:41) (96% - 100%)  Daily     Daily Weight in k.4 (2018 05:13)    General:  Appears stated age, well-groomed, well-nourished, no distress  HEENT:  NC/AT,  conjunctivae clear and pink, no thyromegaly, nodules, adenopathy, no JVD  Chest:  Full & symmetric excursion, no increased effort, breath sounds clear  Cardiovascular:  Regular rhythm, S1, S2, no murmur/rub/S3/S4, no abdominal bruit, no edema  Abdomen:  Soft, non-tender, obese  non-distended, normoactive bowel sounds,  no masses , no hepatosplenomegaly, no signs of chronic liver disease  Extremities:  no cyanosis, clubbing or edema  Skin:  No rash/erythema/ecchymoses/petechiae/wounds/abscess/warm/dry  Neuro/Psych:  Alert, oriented, no asterixis, no tremor, no encephalopathy    Laboratory:                          8.1    12.98 )-----------( 98       ( 2018 07:59 )             24.2     04-24    140  |  103  |  72<H>  ----------------------------<  143<H>  4.8   |  27  |  7.89<H>    Ca    7.9<L>      2018 07:59    TPro  6.1  /  Alb  2.5<L>  /  TBili  0.7  /  DBili  x   /  AST  18  /  ALT  46  /  AlkPhos  62  04-24    LIVER FUNCTIONS - ( 2018 07:59 )  Alb: 2.5 g/dL / Pro: 6.1 gm/dL / ALK PHOS: 62 U/L / ALT: 46 U/L / AST: 18 U/L / GGT: x           PT/INR - ( 2018 07:59 )   PT: 11.3 sec;   INR: 1.04 ratio         PTT - ( 2018 07:59 )  PTT:24.6 sec  Urinalysis Basic - ( 2018 20:31 )    Color: Yellow / Appearance: Clear / S.015 / pH: x  Gluc: x / Ketone: Negative  / Bili: Negative / Urobili: Negative mg/dL   Blood: x / Protein: 500 mg/dL / Nitrite: Negative   Leuk Esterase: Negative / RBC: 6-10 /HPF / WBC 3-5   Sq Epi: x / Non Sq Epi: x / Bacteria: Few        Acute Hepatitis Panel (18 @ 00:59)    Hepatitis C Virus Interpretation: Nonreact: Hepatitis C AB  S/CO Ratio                        Interpretation  < 1.0                                     Non-Reactive  1.0 - 4.9                           Weakly-Reactive  > 5.0                                 Reactive  Non-Reactive: A person witha non-reactive HCV antibody result is  considered uninfected.  No further action is needed unless recent  infection is suspected.  In these cases, consider repeat testing later to  detect seroconversion..  Weakly-Reactive: HCV antibody test is abnormal, HCV RNA Qualitative test  will follow.  Reactive: HCV antibody test is abnormal, HCV RNA Qualitative test will  follow.  Note: HCV antibody testing is performed on the [employer]  system.    Hepatitis C Virus S/CO Ratio: 0.07 S/CO    Hepatitis B Core IgM Antibody: Nonreact    Hepatitis B Surface Antigen: Reactive: Test Repeated, Confirmed Positive    Hepatitis A IgM Antibody: Nonreact      Intake and Output    18 @ 07:01  -  18 @ 07:00  --------------------------------------------------------  Hepatitis B Surface Antigen: Reactive: Test Repeated, Confirmed Positive (18 @ 00:59)  Hepatitis Be Antibody (18 @ 00:59)    Hepatitis Be Antibody: Negative    Hepatitis B Surface AB Quantitative (18 @ 21:33)    Hepatitis B Surface AB Quantitative: <3.0:   HEPBSAB Quantitative Interpretation:  > 12.0         mIU/mL     Immune  8.0-12.0      mIU/mL     Borderline  < 8.0           mIU/mL     Non-Immune mIU/mL      IN: 600 mL / OUT: 1950 mL / NET: -1350 mL    18 @ 07:01  -  18 @ 14:46  --------------------------------------------------------  IN: 300 mL / OUT: 350 mL / NET: -50 mL        Imaging:  EXAM:  CT ABDOMEN AND PELVIS                        PROCEDURE DATE:  2018    INTERPRETATION:  CT ABDOMEN AND PELVIS  HISTORY:  anemia after fall r/o retroperitoneal bleed  Technique: CT of the abdomen and pelvis is performed without oral or   intravenous contrast. Axial images are supplemented with coronal and   sagittal reformations. This study was performed using automatic exposure   control (radiation dose reduction software) to obtain a diagnostic image   quality scan with patient dose as low as reasonably achievable.  Contrast:     None  Comparison: Chest CT 2018  Findings:  LIVER: Normal.  SPLEEN: Normal.  PANCREAS: Normal.  GALLBLADDER/BILIARY TREE: Nondilated. Prior cholecystectomy.  ADRENALS: Normal.  KIDNEYS: No calcification, hydronephrosis, or soft tissue attenuating   renal mass. Bilateral renal cysts.  LYMPHADENOPATHY/RETROPERITONEUM: No adenopathy or hematoma. Nonspecific   mild fluid and stranding in the bilateral retroperitoneum.  VASCULATURE: Normal caliber aorta.  BOWEL: No bowel-related abnormality. Specifically, no evidence of acute   diverticulitis. Normal appendix and ileocecal region.  PELVIC VISCERA: Unremarkable prostate, seminal vesicles, and urinary   bladder.  PELVIC LYMPH NODES: No pelvic adenopathy or hematoma.  PERITONEUM/ABDOMINAL WALL: No free air, ascites, or hemoperitoneum.  SKELETAL: No acute bony abnormality.  LUNG BASES: Small bilateral pleural effusions.    IMPRESSION:     No acute hemorrhage in the abdomen and pelvis.    Nonspecific mild fluid and stranding in the bilateral retroperitoneum.   Correlate with labs for acute pancreatitis.  GRACIA SWANSON M.D., ATTENDING RADIOLOGIST  This document has been electronically signed. 2018 11:12AM

## 2018-04-24 NOTE — PROGRESS NOTE ADULT - SUBJECTIVE AND OBJECTIVE BOX
INTERVAL HPI/OVERNIGHT EVENTS:    patient  took  Prednisone  brought  from  home  which  patient  states  he  takes PRN  for  gout  attacks    REVIEW OF SYSTEMS:  CONSTITUTIONAL:  C/O  rt  knee  pain      NECK: No pain or stiffnes  RESPIRATORY: No SOB   CARDIOVASCULAR: No chest pain, palpitations, dizziness,   GASTROINTESTINAL: No abdominal pain. No nausea, vomiting,   NEUROLOGICAL: No headaches, no  blurry  vision no  dizziness  SKIN: No itching,   MUSCULOSKELETAL: rt  knee  pain    MEDICATION:  acetaminophen   Tablet 650 milliGRAM(s) Oral every 6 hours PRN  acetaminophen   Tablet. 650 milliGRAM(s) Oral every 6 hours PRN  amLODIPine   Tablet 10 milliGRAM(s) Oral daily  AQUAPHOR (petrolatum Ointment) 1 Application(s) Topical two times a day  carvedilol 12.5 milliGRAM(s) Oral every 12 hours  cholecalciferol 400 Unit(s) Oral daily  cloNIDine 0.2 milliGRAM(s) Oral every 8 hours  lisinopril 20 milliGRAM(s) Oral daily    Vital Signs Last 24 Hrs  T(C): 36.7 (2018 05:13), Max: 37.2 (2018 12:35)  T(F): 98.1 (2018 05:13), Max: 98.9 (2018 12:35)  HR: 70 (2018 06:16) (69 - 97)  BP: 156/87 (2018 05:13) (130/79 - 156/87)  BP(mean): --  RR: 17 (2018 05:13) (16 - 18)  SpO2: 99% (2018 06:16) (97% - 100%)    PHYSICAL EXAM:  GENERAL: NAD, well-groomed, well-developed  EYES:  conjunctiva and sclera clear  ENMT:  Moist mucous membranes,   NECK: Supple, No JVD, Normal thyroid  NERVOUS SYSTEM:  Alert oriented   no  focal  deficits;   CHEST/LUNG: Clear    HEART: Regular rate and rhythm; No murmurs, rubs, or gallops  ABDOMEN: Soft, Nontender, Nondistended; Bowel sounds present  EXTREMITIES:  rt  knee  swelling  diffuse  tenderness  SKIN: No rashes   LABS:                        6.2    10.13 )-----------( 92       ( 2018 13:06 )             19.0     04-22    137  |  101  |  98<H>  ----------------------------<  143<H>  5.3   |  23  |  9.91<H>    Ca    7.3<L>      2018 23:27    TPro  5.9<L>  /  Alb  2.7<L>  /  TBili  0.8  /  DBili  x   /  AST  26  /  ALT  61  /  AlkPhos  63  04-22      Urinalysis Basic - ( 2018 20:31 )    Color: Yellow / Appearance: Clear / S.015 / pH: x  Gluc: x / Ketone: Negative  / Bili: Negative / Urobili: Negative mg/dL   Blood: x / Protein: 500 mg/dL / Nitrite: Negative   Leuk Esterase: Negative / RBC: 6-10 /HPF / WBC 3-5   Sq Epi: x / Non Sq Epi: x / Bacteria: Few      CAPILLARY BLOOD GLUCOSE          RADIOLOGY & ADDITIONAL TESTS:    Imaging reports  Personally Reviewed:  [x ] YES  [ ] NO    Consultant(s) Notes Reviewed:  [ x] YES  [ ] NO    Care Discussed with Consultants/Other Providers [x ] YES  [ ] NO  Assessment and Plan:   Problem/Plan - 1:  ·  Problem: Shortness of breath.  Plan: multi factorial  CKD  anemia  fluif  overload     Problem/Plan - 2:  ·  Problem: ESRF (end stage renal failure).  Plan: HD  as  per  nephrology.     Problem/Plan - 3:  ·  Problem: Anemia.  Plan: transfuse  as  needed.  for  GI  eval      Problem/Plan - 4:  ·  Problem: Hyperkalemia, diminished renal excretion.  Plan: insulin  calcium.     Problem/Plan - 5:  HTN  clonidine  Carvediol  norvasc  lisinopril  norvasc    GOUT  RT KNEE  PAIN  PREDNISONE  FOR  RHEUMATOLOGY  EVALUATION    HEPATITIS  BSAg FOR GI  EVALAUTION

## 2018-04-24 NOTE — PROGRESS NOTE ADULT - SUBJECTIVE AND OBJECTIVE BOX
Patient seen and examined bedside, went to OR holding for scheduled Permacath placement however decided not to go through with procedure as he wants to have AVF placed. After lengthy conversation explaining risks and benefits of having HD access pt now wants to have permacath placed at the same time of AVF if possible. No acute complaints offered .   Denies chest pain, dyspnea, cough.    T(F): 98.7 (04-24-18 @ 11:41), Max: 98.9 (04-23-18 @ 12:35)  HR: 72 (04-24-18 @ 11:41) (69 - 97)  BP: 152/86 (04-24-18 @ 11:41) (130/79 - 156/87)  RR: 17 (04-24-18 @ 11:41) (16 - 18)  SpO2: 96% (04-24-18 @ 11:41) (96% - 100%)  Wt(kg): --  CAPILLARY BLOOD GLUCOSE    GENERAL: NAD, well-groomed, well-developed  EYES:  conjunctiva and sclera clear  ENMT:  Moist mucous membranes,   NECK: Supple, No JVD, Normal thyroid  NERVOUS SYSTEM:  Alert oriented   no  focal  deficits;   CHEST/LUNG: Clear    HEART: Regular rate and rhythm; No murmurs, rubs, or gallops  ABDOMEN: Soft, Nontender, Nondistended; Bowel sounds present  EXTREMITIES:  rt  knee  swelling  diffuse  tenderness  SKIN: No rashes       LABS:                        8.1    12.98 )-----------( 98       ( 24 Apr 2018 07:59 )             24.2     04-24    140  |  103  |  72<H>  ----------------------------<  143<H>  4.8   |  27  |  7.89<H>    Ca    7.9<L>      24 Apr 2018 07:59    TPro  6.1  /  Alb  2.5<L>  /  TBili  0.7  /  DBili  x   /  AST  18  /  ALT  46  /  AlkPhos  62  04-24    PT/INR - ( 24 Apr 2018 07:59 )   PT: 11.3 sec;   INR: 1.04 ratio         PTT - ( 24 Apr 2018 07:59 )  PTT:24.6 sec  I&O's Detail    23 Apr 2018 07:01  -  24 Apr 2018 07:00  --------------------------------------------------------  IN:    Oral Fluid: 600 mL  Total IN: 600 mL    OUT:    Other: 1500 mL    Voided: 450 mL  Total OUT: 1950 mL    Total NET: -1350 mL        Culture Results:   No growth to date. (04-23 @ 08:27)  Culture Results:   No growth to date. (04-23 @ 08:27)  Culture Results:   10,000 - 49,000 CFU/mL Coag Negative Staphylococcus (04-20 @ 17:57)  Culture Results:   No growth to date. (04-20 @ 12:41)  Culture Results:   No growth to date. (04-20 @ 12:41)      Impression: 57y Male with ESRD requiring HD    PMH       Plan:  - vein mapping of B/L UE  - Possible Permacath placement by IR if pt still agrees  - AVF placement this week   -continue medical management  -discussed with Dr. Carroll

## 2018-04-25 LAB
AFP-TM SERPL-MCNC: 1.6 NG/ML — SIGNIFICANT CHANGE UP
ALBUMIN SERPL ELPH-MCNC: 2.6 G/DL — LOW (ref 3.3–5)
ALP SERPL-CCNC: 68 U/L — SIGNIFICANT CHANGE UP (ref 40–120)
ALT FLD-CCNC: 47 U/L — SIGNIFICANT CHANGE UP (ref 12–78)
ANION GAP SERPL CALC-SCNC: 15 MMOL/L — SIGNIFICANT CHANGE UP (ref 5–17)
AST SERPL-CCNC: 20 U/L — SIGNIFICANT CHANGE UP (ref 15–37)
BILIRUB SERPL-MCNC: 0.5 MG/DL — SIGNIFICANT CHANGE UP (ref 0.2–1.2)
BUN SERPL-MCNC: 98 MG/DL — HIGH (ref 7–23)
CALCIUM SERPL-MCNC: 8.1 MG/DL — LOW (ref 8.5–10.1)
CHLORIDE SERPL-SCNC: 100 MMOL/L — SIGNIFICANT CHANGE UP (ref 96–108)
CO2 SERPL-SCNC: 23 MMOL/L — SIGNIFICANT CHANGE UP (ref 22–31)
CREAT SERPL-MCNC: 9.34 MG/DL — HIGH (ref 0.5–1.3)
CULTURE RESULTS: NO GROWTH — SIGNIFICANT CHANGE UP
CULTURE RESULTS: SIGNIFICANT CHANGE UP
CULTURE RESULTS: SIGNIFICANT CHANGE UP
GLUCOSE SERPL-MCNC: 130 MG/DL — HIGH (ref 70–99)
HCT VFR BLD CALC: 24.7 % — LOW (ref 39–50)
HGB BLD-MCNC: 8.2 G/DL — LOW (ref 13–17)
MCHC RBC-ENTMCNC: 27.4 PG — SIGNIFICANT CHANGE UP (ref 27–34)
MCHC RBC-ENTMCNC: 33.2 GM/DL — SIGNIFICANT CHANGE UP (ref 32–36)
MCV RBC AUTO: 82.6 FL — SIGNIFICANT CHANGE UP (ref 80–100)
NRBC # BLD: 0 /100 WBCS — SIGNIFICANT CHANGE UP (ref 0–0)
PLATELET # BLD AUTO: 129 K/UL — LOW (ref 150–400)
POTASSIUM SERPL-MCNC: 4.8 MMOL/L — SIGNIFICANT CHANGE UP (ref 3.5–5.3)
POTASSIUM SERPL-SCNC: 4.8 MMOL/L — SIGNIFICANT CHANGE UP (ref 3.5–5.3)
PROT SERPL-MCNC: 6.1 GM/DL — SIGNIFICANT CHANGE UP (ref 6–8.3)
RBC # BLD: 2.99 M/UL — LOW (ref 4.2–5.8)
RBC # FLD: 14.6 % — HIGH (ref 10.3–14.5)
SODIUM SERPL-SCNC: 138 MMOL/L — SIGNIFICANT CHANGE UP (ref 135–145)
SPECIMEN SOURCE: SIGNIFICANT CHANGE UP
WBC # BLD: 13.34 K/UL — HIGH (ref 3.8–10.5)
WBC # FLD AUTO: 13.34 K/UL — HIGH (ref 3.8–10.5)

## 2018-04-25 PROCEDURE — 93931 UPPER EXTREMITY STUDY: CPT | Mod: 26

## 2018-04-25 PROCEDURE — 71045 X-RAY EXAM CHEST 1 VIEW: CPT | Mod: 26,59

## 2018-04-25 PROCEDURE — 77001 FLUOROGUIDE FOR VEIN DEVICE: CPT | Mod: 26

## 2018-04-25 PROCEDURE — 36558 INSERT TUNNELED CV CATH: CPT

## 2018-04-25 PROCEDURE — 76937 US GUIDE VASCULAR ACCESS: CPT | Mod: 26

## 2018-04-25 RX ADMIN — CARVEDILOL PHOSPHATE 12.5 MILLIGRAM(S): 80 CAPSULE, EXTENDED RELEASE ORAL at 06:26

## 2018-04-25 RX ADMIN — AMLODIPINE BESYLATE 10 MILLIGRAM(S): 2.5 TABLET ORAL at 06:26

## 2018-04-25 RX ADMIN — Medication 1 APPLICATION(S): at 06:27

## 2018-04-25 RX ADMIN — CARVEDILOL PHOSPHATE 12.5 MILLIGRAM(S): 80 CAPSULE, EXTENDED RELEASE ORAL at 17:11

## 2018-04-25 RX ADMIN — Medication 1 APPLICATION(S): at 17:12

## 2018-04-25 RX ADMIN — Medication 0.2 MILLIGRAM(S): at 15:29

## 2018-04-25 RX ADMIN — Medication 0.2 MILLIGRAM(S): at 21:52

## 2018-04-25 RX ADMIN — Medication 400 UNIT(S): at 15:30

## 2018-04-25 RX ADMIN — Medication 40 MILLIGRAM(S): at 06:27

## 2018-04-25 RX ADMIN — Medication 0.2 MILLIGRAM(S): at 06:26

## 2018-04-25 NOTE — PROGRESS NOTE ADULT - SUBJECTIVE AND OBJECTIVE BOX
INTERVAL HPI/OVERNIGHT EVENTS:  permatacth  not  done  yesterday   to  be done tomorrow  along  with  AV fistula  placement      REVIEW OF SYSTEMS:  CONSTITUTIONAL: feels presents  well no  complaints    NECK: No pain or stiffnes  RESPIRATORY: No SOB   CARDIOVASCULAR: No chest pain, palpitations, dizziness,   GASTROINTESTINAL: No abdominal pain. No nausea, vomiting,   NEUROLOGICAL: No headaches, no  blurry  vision no  dizziness  SKIN: No itching,   MUSCULOSKELETAL: mild  knee  pain  feeling  bettter    MEDICATION:  acetaminophen   Tablet 650 milliGRAM(s) Oral every 6 hours PRN  acetaminophen   Tablet. 650 milliGRAM(s) Oral every 6 hours PRN  amLODIPine   Tablet 10 milliGRAM(s) Oral daily  AQUAPHOR (petrolatum Ointment) 1 Application(s) Topical two times a day  carvedilol 12.5 milliGRAM(s) Oral every 12 hours  cholecalciferol 400 Unit(s) Oral daily  cloNIDine 0.2 milliGRAM(s) Oral every 8 hours  predniSONE   Tablet 40 milliGRAM(s) Oral daily    Vital Signs Last 24 Hrs  T(C): 36.3 (2018 06:05), Max: 37.4 (2018 17:16)  T(F): 97.3 (2018 06:05), Max: 99.3 (2018 17:16)  HR: 72 (2018 06:51) (71 - 80)  BP: 164/95 (2018 06:05) (145/82 - 164/95)  BP(mean): --  RR: 18 (2018 06:05) (17 - 18)  SpO2: 98% (2018 06:51) (96% - 100%)    PHYSICAL EXAM:  GENERAL: NAD, well-groomed, well-developed  EYES:  conjunctiva and sclera clear  ENMT:  Moist mucous membranes,   NECK: Supple, No JVD, Normal thyroid  NERVOUS SYSTEM:  Alert oriented   no  focal  deficits;   CHEST/LUNG: Clear    HEART: Regular rate and rhythm; No murmurs, rubs, or gallops  ABDOMEN: Soft, Nontender, Nondistended; Bowel sounds present  EXTREMITIES:  no  edema no  tenderness  SKIN: No rashes   LABS:                        8.1    12.98 )-----------( 98       ( 2018 07:59 )             24.2     04-24    140  |  103  |  72<H>  ----------------------------<  143<H>  4.8   |  27  |  7.89<H>    Ca    7.9<L>      2018 07:59    TPro  6.1  /  Alb  2.5<L>  /  TBili  0.7  /  DBili  x   /  AST  18  /  ALT  46  /  AlkPhos  62  04-24    PT/INR - ( 2018 07:59 )   PT: 11.3 sec;   INR: 1.04 ratio         PTT - ( 2018 07:59 )  PTT:24.6 sec  Urinalysis Basic - ( 2018 20:31 )    Color: Yellow / Appearance: Clear / S.015 / pH: x  Gluc: x / Ketone: Negative  / Bili: Negative / Urobili: Negative mg/dL   Blood: x / Protein: 500 mg/dL / Nitrite: Negative   Leuk Esterase: Negative / RBC: 6-10 /HPF / WBC 3-5   Sq Epi: x / Non Sq Epi: x / Bacteria: Few      CAPILLARY BLOOD GLUCOSE          RADIOLOGY & ADDITIONAL TESTS:    Imaging reports  Personally Reviewed:  [ x] YES  [ ] NO    Consultant(s) Notes Reviewed:  [ x] YES  [ ] NO    Care Discussed with Consultants/Other Providers [x ] YES  [ ] NO  Problem/Plan - 1:  ·  Problem: Shortness of breath.  Plan: multi factorial  CKD  anemia  fluif  overload     Problem/Plan - 2:  ·  Problem: ESRF (end stage renal failure).  Plan: HD  as  per  nephrology.     Problem/Plan - 3:  ·  Problem: Anemia.  Plan: transfuse  as  needed.  for  GI  eval      Problem/Plan - 4:  ·  Problem: Hyperkalemia, diminished renal excretion.  Plan: insulin  calcium.     Problem/Plan - 5:  HTN  clonidine  Carvediol  norvasc  lisinopril  norvasc    GOUT  RT KNEE  PAIN  PREDNISONE awaiting  rheumatology  eval  for  PT  eval  might  be  a  candidate  for  rehab

## 2018-04-25 NOTE — PROGRESS NOTE ADULT - SUBJECTIVE AND OBJECTIVE BOX
Patient is a 57y old  Male who presents with a chief complaint of BEE  PLEURAL  EFFUSION  ANEMIA RENAL  FAILURE (20 Apr 2018 13:08)    HPI: PT  C/O  BEE  THIS  AM  WHILE  WALKING  IN  KITCHEN,  NO  CP  NO  PALPITATIONS - EVALUATED  IN  ER  FOUND  TO  HAVE  BILATERAL  PLEURAL  EFFUSIONS,  SEVERE  ANEMIA 6.4, ACUTE  ON  CHRONIC  RENAL  FAILURE, TROPONIN  ELEVATION - ADMITTED  FOR  FURTHER  W/U  AND  TREATMENT (20 Apr 2018 13:08)    Fell from bed - did not hit head, but hurt R knee, believes it was gout attack and took prednisone with improvement. No other specific complaints. s/p HD, tranfusion, feeling better, breathing well. R knee pain subsiding - often calls PMD for prescription for prednisone as soon as he feels gout pain.    Dr. White, Kartik Carroll Reddy checking Hep viral load    Had HD today - feeling better - awaiting Permacath and AV shunt. Right knee pain and swelling decreasing - has not been able to bear weight on it since admission.    REVIEW OF SYSTEMS as above    PAST MEDICAL & SURGICAL HISTORY  CKD - pending AV graft for HD in near future    SOCHX: Lives with daughter, house with 4 step entrance, rooms on 1st floor.  Ind mobility and ADLs  Occasional cigars, no alcohol abuse    FMHX: FA/MO  noncontributory     ALLERGIES  No Known Allergies    MEDICATIONS reviewed    Wt(kg): -- 101.4 kg    PHYSICAL EXAM BMI 30.3  Constitutional - NAD, Comfortable in bed, O2 NC  Cardiovascular - RRR  Abdomen - Soft, Not tender  Extremities - Functional range of motion except R knee +effusion, mild warmth, tender, still painful ROM - again states "much better"   Neurologic -                    Cognitive - Awake, Alert, Oriented X3     Speech Intact     Language  Intact     Motor - No focal deficits     Psychiatric - Mood stable, Affect WNL    RECENT LABS reviewed    IMAGING reviewed:   CXR - small pleural effusions    FUNCTIONAL HISTORY Ind mobility, ADLs without device.    CURRENT FUNCTIONAL STATUS to be determined    IMPRESSION: 57 M with possible gout R knee, obese BMI 30.3, HTN, ESRD sooner than expected, s/p emergent HD for K+ 7, transfusion for Hgb 6.4. For Permacath, AV fistula tomorrow?    PLAN: Will follow - recommendations will depend upon clinical and functional course - anticipate he will have difficulty ambulating when stable - may require rehab.

## 2018-04-25 NOTE — PROGRESS NOTE ADULT - SUBJECTIVE AND OBJECTIVE BOX
Gastroenterology  Patient seen and examined bedside resting comfortably.  No complaints offered. No Abdominal pain  Denies nausea and vomiting. Tolerating diet.  Normal flatus/BM.     T(F): 96.5 (04-25-18 @ 15:03), Max: 99.3 (04-24-18 @ 17:16)  HR: 66 (04-25-18 @ 15:03) (66 - 80)  BP: 147/87 (04-25-18 @ 15:03) (147/87 - 164/95)  RR: 17 (04-25-18 @ 15:03) (17 - 18)  SpO2: 100% (04-25-18 @ 15:03) (97% - 100%)  Wt(kg): --  CAPILLARY BLOOD GLUCOSE    PHYSICAL EXAM:  General: NAD, WDWN.   Neuro:  Alert & responsive  HEENT: NCAT, EOMI, conjunctiva clear  CV: +S1+S2 regular rate and rhythm  Lung: clear to ausculation bilaterally, respirations nonlabored, good inspiratory effort  Abdomen: soft, Non tender, No Distension. Normal active BS  Extremities: no pedal edema or calf tenderness noted     LABS:                        8.2    13.34 )-----------( 129      ( 25 Apr 2018 08:36 )             24.7     04-25    138  |  100  |  98<H>  ----------------------------<  130<H>  4.8   |  23  |  9.34<H>    Ca    8.1<L>      25 Apr 2018 08:36    TPro  6.1  /  Alb  2.6<L>  /  TBili  0.5  /  DBili  x   /  AST  20  /  ALT  47  /  AlkPhos  68  04-25    LIVER FUNCTIONS - ( 25 Apr 2018 08:36 )  Alb: 2.6 g/dL / Pro: 6.1 gm/dL / ALK PHOS: 68 U/L / ALT: 47 U/L / AST: 20 U/L / GGT: x           PT/INR - ( 24 Apr 2018 07:59 )   PT: 11.3 sec;   INR: 1.04 ratio         PTT - ( 24 Apr 2018 07:59 )  PTT:24.6 sec  I&O's Detail    24 Apr 2018 07:01  -  25 Apr 2018 07:00  --------------------------------------------------------  IN:    Oral Fluid: 900 mL  Total IN: 900 mL    OUT:    Voided: 900 mL  Total OUT: 900 mL    Total NET: 0 mL        04-25 @ 08:36    138 | 100 | 98  /8.1 | -- | --  _______________________/  4.8 | 23 | 9.34                           \par

## 2018-04-25 NOTE — PROGRESS NOTE ADULT - SUBJECTIVE AND OBJECTIVE BOX
Assessment:    Renal failure,  ESRD, Nephrology noted, needed to make necessary arrangements for long term HD  SOB AND symptomatology primarily renal  Patient states had stress test and CV w/u a few months ago for renal transplant consideration  All negative  Tolerating HD  Vasc noted  Perma cath

## 2018-04-25 NOTE — PROGRESS NOTE ADULT - SUBJECTIVE AND OBJECTIVE BOX
Post perm cath no distress      MEDICATIONS  (STANDING):  amLODIPine   Tablet 10 milliGRAM(s) Oral daily  AQUAPHOR (petrolatum Ointment) 1 Application(s) Topical two times a day  carvedilol 12.5 milliGRAM(s) Oral every 12 hours  cholecalciferol 400 Unit(s) Oral daily  cloNIDine 0.2 milliGRAM(s) Oral every 8 hours  predniSONE   Tablet 40 milliGRAM(s) Oral daily    MEDICATIONS  (PRN):  acetaminophen   Tablet 650 milliGRAM(s) Oral every 6 hours PRN For Temp greater than 38 C (100.4 F)  acetaminophen   Tablet. 650 milliGRAM(s) Oral every 6 hours PRN Mild Pain (1 - 3)      04-24-18 @ 07:01  -  04-25-18 @ 07:00  --------------------------------------------------------  IN: 900 mL / OUT: 900 mL / NET: 0 mL      PHYSICAL EXAM:      T(C): 36.2 (04-25-18 @ 17:17), Max: 37.1 (04-24-18 @ 23:56)  HR: 62 (04-25-18 @ 17:17) (62 - 80)  BP: 146/84 (04-25-18 @ 17:17) (146/84 - 164/95)  RR: 18 (04-25-18 @ 17:17) (17 - 18)  SpO2: 100% (04-25-18 @ 17:17) (97% - 100%)  Wt(kg): --  Respiratory: clear anteriorly, decreased BS at bases  Cardiovascular: S1 S2  Gastrointestinal: soft NT ND +BS  Extremities:   tr edema                                    8.2    13.34 )-----------( 129      ( 25 Apr 2018 08:36 )             24.7     04-25    138  |  100  |  98<H>  ----------------------------<  130<H>  4.8   |  23  |  9.34<H>    Ca    8.1<L>      25 Apr 2018 08:36    TPro  6.1  /  Alb  2.6<L>  /  TBili  0.5  /  DBili  x   /  AST  20  /  ALT  47  /  AlkPhos  68  04-25      LIVER FUNCTIONS - ( 25 Apr 2018 08:36 )  Alb: 2.6 g/dL / Pro: 6.1 gm/dL / ALK PHOS: 68 U/L / ALT: 47 U/L / AST: 20 U/L / GGT: x             Assessment and Plan:  HD for tomorrow;   Discharge planning;

## 2018-04-25 NOTE — PROCEDURE NOTE - ADDITIONAL PROCEDURE DETAILS
A 16fr x 19cm tip to cuff tunneled dialysis catheter was placed via US guidance into right external jugular vein.  Proper placement confirmed by fluoroscopy.  Hemostasis achieved.  Pt tolerated procedure well.  Vitals remained stable.  -Catheter can be accessed

## 2018-04-25 NOTE — PROCEDURE NOTE - NSSITEPREP_SKIN_A_CORE
chlorhexidine
chlorhexidine/Adherence to aseptic technique: hand hygiene prior to donning barriers (gown, gloves), don cap and mask, sterile drape over patient

## 2018-04-26 ENCOUNTER — TRANSCRIPTION ENCOUNTER (OUTPATIENT)
Age: 58
End: 2018-04-26

## 2018-04-26 LAB
ALBUMIN SERPL ELPH-MCNC: 2.6 G/DL — LOW (ref 3.3–5)
ALP SERPL-CCNC: 79 U/L — SIGNIFICANT CHANGE UP (ref 40–120)
ALT FLD-CCNC: 62 U/L — SIGNIFICANT CHANGE UP (ref 12–78)
ANION GAP SERPL CALC-SCNC: 17 MMOL/L — SIGNIFICANT CHANGE UP (ref 5–17)
APTT BLD: 20.3 SEC — LOW (ref 27.5–37.4)
AST SERPL-CCNC: 38 U/L — HIGH (ref 15–37)
BILIRUB SERPL-MCNC: 0.5 MG/DL — SIGNIFICANT CHANGE UP (ref 0.2–1.2)
BLD GP AB SCN SERPL QL: SIGNIFICANT CHANGE UP
BUN SERPL-MCNC: 129 MG/DL — HIGH (ref 7–23)
CALCIUM SERPL-MCNC: 8.2 MG/DL — LOW (ref 8.5–10.1)
CHLORIDE SERPL-SCNC: 99 MMOL/L — SIGNIFICANT CHANGE UP (ref 96–108)
CO2 SERPL-SCNC: 23 MMOL/L — SIGNIFICANT CHANGE UP (ref 22–31)
CREAT SERPL-MCNC: 10.7 MG/DL — HIGH (ref 0.5–1.3)
GLUCOSE SERPL-MCNC: 127 MG/DL — HIGH (ref 70–99)
HBV DNA # SERPL NAA+PROBE: 8289 IU/ML — SIGNIFICANT CHANGE UP
HBV DNA SERPL NAA+PROBE-LOG#: 3.92 — SIGNIFICANT CHANGE UP
HBV E AG SER-ACNC: POSITIVE
HBV SURFACE AB SER-ACNC: SIGNIFICANT CHANGE UP
HCT VFR BLD CALC: 27.1 % — LOW (ref 39–50)
HGB BLD-MCNC: 9 G/DL — LOW (ref 13–17)
INR BLD: 0.97 RATIO — SIGNIFICANT CHANGE UP (ref 0.88–1.16)
M PROTEIN 24H UR ELPH-MRATE: SIGNIFICANT CHANGE UP
MCHC RBC-ENTMCNC: 27.2 PG — SIGNIFICANT CHANGE UP (ref 27–34)
MCHC RBC-ENTMCNC: 33.2 GM/DL — SIGNIFICANT CHANGE UP (ref 32–36)
MCV RBC AUTO: 81.9 FL — SIGNIFICANT CHANGE UP (ref 80–100)
NRBC # BLD: 0 /100 WBCS — SIGNIFICANT CHANGE UP (ref 0–0)
PLATELET # BLD AUTO: 153 K/UL — SIGNIFICANT CHANGE UP (ref 150–400)
POTASSIUM SERPL-MCNC: 5.1 MMOL/L — SIGNIFICANT CHANGE UP (ref 3.5–5.3)
POTASSIUM SERPL-SCNC: 5.1 MMOL/L — SIGNIFICANT CHANGE UP (ref 3.5–5.3)
PROT SERPL-MCNC: 6.2 GM/DL — SIGNIFICANT CHANGE UP (ref 6–8.3)
PROTHROM AB SERPL-ACNC: 10.6 SEC — SIGNIFICANT CHANGE UP (ref 9.8–12.7)
PTH RELATED PROT SERPL-MCNC: <2 PMOL/L — SIGNIFICANT CHANGE UP
RBC # BLD: 3.31 M/UL — LOW (ref 4.2–5.8)
RBC # FLD: 14.5 % — SIGNIFICANT CHANGE UP (ref 10.3–14.5)
SODIUM SERPL-SCNC: 139 MMOL/L — SIGNIFICANT CHANGE UP (ref 135–145)
WBC # BLD: 14.25 K/UL — HIGH (ref 3.8–10.5)
WBC # FLD AUTO: 14.25 K/UL — HIGH (ref 3.8–10.5)

## 2018-04-26 PROCEDURE — 99024 POSTOP FOLLOW-UP VISIT: CPT

## 2018-04-26 RX ADMIN — CARVEDILOL PHOSPHATE 12.5 MILLIGRAM(S): 80 CAPSULE, EXTENDED RELEASE ORAL at 18:05

## 2018-04-26 RX ADMIN — Medication 0.2 MILLIGRAM(S): at 05:13

## 2018-04-26 RX ADMIN — Medication 1 APPLICATION(S): at 05:14

## 2018-04-26 RX ADMIN — Medication 0.2 MILLIGRAM(S): at 21:31

## 2018-04-26 RX ADMIN — Medication 40 MILLIGRAM(S): at 05:14

## 2018-04-26 RX ADMIN — CARVEDILOL PHOSPHATE 12.5 MILLIGRAM(S): 80 CAPSULE, EXTENDED RELEASE ORAL at 05:13

## 2018-04-26 RX ADMIN — Medication 0.2 MILLIGRAM(S): at 16:09

## 2018-04-26 RX ADMIN — AMLODIPINE BESYLATE 10 MILLIGRAM(S): 2.5 TABLET ORAL at 05:13

## 2018-04-26 RX ADMIN — Medication 400 UNIT(S): at 11:25

## 2018-04-26 RX ADMIN — Medication 1 APPLICATION(S): at 18:05

## 2018-04-26 NOTE — PROGRESS NOTE ADULT - SUBJECTIVE AND OBJECTIVE BOX
Subjective: No dyspnea. Good appetite.       MEDICATIONS  (STANDING):  amLODIPine   Tablet 10 milliGRAM(s) Oral daily  AQUAPHOR (petrolatum Ointment) 1 Application(s) Topical two times a day  carvedilol 12.5 milliGRAM(s) Oral every 12 hours  cholecalciferol 400 Unit(s) Oral daily  cloNIDine 0.2 milliGRAM(s) Oral every 8 hours  predniSONE   Tablet 40 milliGRAM(s) Oral daily    MEDICATIONS  (PRN):  acetaminophen   Tablet 650 milliGRAM(s) Oral every 6 hours PRN For Temp greater than 38 C (100.4 F)  acetaminophen   Tablet. 650 milliGRAM(s) Oral every 6 hours PRN Mild Pain (1 - 3)          T(C): 35.9 (04-26-18 @ 05:27), Max: 36.2 (04-25-18 @ 17:17)  HR: 86 (04-26-18 @ 09:40) (62 - 86)  BP: 171/95 (04-26-18 @ 09:40) (146/84 - 171/95)  RR: 18 (04-26-18 @ 09:40) (17 - 18)  SpO2: 100% (04-26-18 @ 09:40) (95% - 100%)  Wt(kg): --        I&O's Detail    25 Apr 2018 07:01  -  26 Apr 2018 07:00  --------------------------------------------------------  IN:    Oral Fluid: 360 mL  Total IN: 360 mL    OUT:    Voided: 850 mL  Total OUT: 850 mL    Total NET: -490 mL               PHYSICAL EXAM:    GENERAL: no distress  EYES: EOMI, PERRLA, conjunctiva and sclera clear  NECK: Supple, no inc in JVP  CHEST/LUNG: Clear  HEART: S1S2  ABDOMEN: Soft, Nontender, Nondistended; Bowel sounds present  EXTREMITIES:  trace edema  NEURO: no asterixis  ACCESS: R chest PC      LABS:  CBC Full  -  ( 25 Apr 2018 08:36 )  WBC Count : 13.34 K/uL  Hemoglobin : 8.2 g/dL  Hematocrit : 24.7 %  Platelet Count - Automated : 129 K/uL  Mean Cell Volume : 82.6 fl  Mean Cell Hemoglobin : 27.4 pg  Mean Cell Hemoglobin Concentration : 33.2 gm/dL  Auto Neutrophil # : x  Auto Lymphocyte # : x  Auto Monocyte # : x  Auto Eosinophil # : x  Auto Basophil # : x  Auto Neutrophil % : x  Auto Lymphocyte % : x  Auto Monocyte % : x  Auto Eosinophil % : x  Auto Basophil % : x    04-25    138  |  100  |  98<H>  ----------------------------<  130<H>  4.8   |  23  |  9.34<H>    Ca    8.1<L>      25 Apr 2018 08:36    TPro  6.1  /  Alb  2.6<L>  /  TBili  0.5  /  DBili  x   /  AST  20  /  ALT  47  /  AlkPhos  68  04-25        Culture Results:   No growth (04-24 @ 09:16)      Impression:  * ESRD. New HD start on this admission for progressive CKD  * S/p PC insertion  * Fluid overload improved  * HTN    Recommendations:  * HD #3 today. UF as BP permits  * Renal diet, PO FR  * May add ARB on dc  * Once outpt HD spot is secured, may dc

## 2018-04-26 NOTE — PROGRESS NOTE ADULT - SUBJECTIVE AND OBJECTIVE BOX
Patient is a 57y old  Male who presents with a chief complaint of BEE  PLEURAL  EFFUSION  ANEMIA RENAL  FAILURE (20 Apr 2018 13:08)    HPI: PT  C/O  BEE  THIS  AM  WHILE  WALKING  IN  KITCHEN,  NO  CP  NO  PALPITATIONS - EVALUATED  IN  ER  FOUND  TO  HAVE  BILATERAL  PLEURAL  EFFUSIONS,  SEVERE  ANEMIA 6.4, ACUTE  ON  CHRONIC  RENAL  FAILURE, TROPONIN  ELEVATION - ADMITTED  FOR  FURTHER  W/U  AND  TREATMENT (20 Apr 2018 13:08)    Fell from bed - did not hit head, but hurt R knee, believes it was gout attack and took prednisone with improvement. No other specific complaints. s/p HD, tranfusion, feeling better, breathing well. R knee pain subsiding - often calls PMD for prescription for prednisone as soon as he feels gout pain.     Dr. White, Kartik Carroll Reddy checking Hep viral load    Had HD today - feeling better - awaiting Permacath and AV shunt later today. Right knee pain and swelling still decreasing - walked down hallway, several steps with PTx.  Pleased with ability - wants to go home when cleared by Dr. Burks.  REVIEW OF SYSTEMS as above    PAST MEDICAL & SURGICAL HISTORY  CKD - pending AV graft for HD in near future    SOCHX: Lives with daughter, house with 4 step entrance, rooms on 1st floor.  Ind mobility and ADLs  Occasional cigars, no alcohol abuse    FMHX: FA/MO  noncontributory     ALLERGIES  No Known Allergies    MEDICATIONS reviewed    Wt(kg): -- 101.4 kg    PHYSICAL EXAM BMI 30.3  Constitutional - NAD, Comfortable in bed, O2 NC  Cardiovascular - RRR  Abdomen - Soft, Not tender  Extremities - Functional range of motion except R knee min effusion, mild warmth, still painful ROM - again states "much better"   Neurologic -                    Cognitive - Awake, Alert, Oriented X3     Speech Intact     Language  Intact     Motor - No focal deficits     Psychiatric - Mood stable, Affect WNL    RECENT LABS reviewed    IMAGING reviewed:   CXR - small pleural effusions    FUNCTIONAL HISTORY Ind mobility, ADLs without device.    CURRENT FUNCTIONAL STATUS to be determined    IMPRESSION: 57 M with possible gout R knee, obese BMI 30.3, HTN, ESRD sooner than expected, s/p emergent HD for K+ 7, transfusion for Hgb 6.4. For Permacath, AV fistula tomorrow?    PLAN: Anticipate he will be safe going home when cleared by Dr. Burks.

## 2018-04-26 NOTE — PROGRESS NOTE ADULT - SUBJECTIVE AND OBJECTIVE BOX
Gastroenterology  Patient seen and examined bedside resting comfortably.  No complaints offered. No Abdominal pain  Denies nausea and vomiting. Tolerating diet.  Normal flatus/BM.     T(F): 97.8 (04-26-18 @ 11:39), Max: 97.8 (04-26-18 @ 11:39)  HR: 71 (04-26-18 @ 11:39) (62 - 86)  BP: 164/92 (04-26-18 @ 11:39) (146/84 - 171/95)  RR: 17 (04-26-18 @ 11:39) (17 - 18)  SpO2: 99% (04-26-18 @ 11:39) (95% - 100%)  Wt(kg): --  CAPILLARY BLOOD GLUCOSE      PHYSICAL EXAM:  General: NAD, WDWN.   Neuro:  Alert & responsive  HEENT: NCAT, EOMI, conjunctiva clear  CV: +S1+S2 regular rate and rhythm  Lung: clear to ausculation bilaterally, respirations nonlabored, good inspiratory effort  Abdomen: soft, Non tender, No Distension. Normal active BS  Extremities: no pedal edema or calf tenderness noted     LABS:                        8.2    13.34 )-----------( 129      ( 25 Apr 2018 08:36 )             24.7     04-25    138  |  100  |  98<H>  ----------------------------<  130<H>  4.8   |  23  |  9.34<H>    Ca    8.1<L>      25 Apr 2018 08:36    TPro  6.1  /  Alb  2.6<L>  /  TBili  0.5  /  DBili  x   /  AST  20  /  ALT  47  /  AlkPhos  68  04-25    LIVER FUNCTIONS - ( 25 Apr 2018 08:36 )  Alb: 2.6 g/dL / Pro: 6.1 gm/dL / ALK PHOS: 68 U/L / ALT: 47 U/L / AST: 20 U/L / GGT: x             I&O's Detail    25 Apr 2018 07:01  -  26 Apr 2018 07:00  --------------------------------------------------------  IN:    Oral Fluid: 360 mL  Total IN: 360 mL    OUT:    Voided: 850 mL  Total OUT: 850 mL    Total NET: -490 mL        04-25 @ 08:36    138 | 100 | 98  /8.1 | -- | --  _______________________/  4.8 | 23 | 9.34                           \par

## 2018-04-26 NOTE — PHYSICAL THERAPY INITIAL EVALUATION ADULT - ADDITIONAL COMMENTS
Pt lives with daughter. 4 steps to enter home c bilateral rails (too wide to hold both simultaneously). 13 steps to enter basement for leisure.

## 2018-04-26 NOTE — PHYSICAL THERAPY INITIAL EVALUATION ADULT - GAIT DEVIATIONS NOTED, PT EVAL
decreased bryan/decreased velocity of limb motion/decreased stride length/decreased weight-shifting ability/decreased step length

## 2018-04-26 NOTE — PHYSICAL THERAPY INITIAL EVALUATION ADULT - ACTIVE RANGE OF MOTION EXAMINATION, REHAB EVAL
no Active ROM deficits were identified/except R hip, R Knee, R ankle due to pain and weakness/deficits as listed below

## 2018-04-26 NOTE — PHYSICAL THERAPY INITIAL EVALUATION ADULT - CRITERIA FOR SKILLED THERAPEUTIC INTERVENTIONS
functional limitations in following categories/therapy frequency/anticipated discharge recommendation/impairments found/rehab potential/predicted duration of therapy intervention/risk reduction/prevention/anticipated equipment needs at discharge

## 2018-04-26 NOTE — PROGRESS NOTE ADULT - SUBJECTIVE AND OBJECTIVE BOX
Patient s/p Fluoroscopic and ultrasound guided tunneled dialysis catheter.  Pt is now ESRD.  Pt doing well, no new complaints.  Pt scheduled for dialysis today, dressing to be changed in dialysis.  Labs and vitals stable.            PHYSICAL EXAM:    Vital Signs Last 24 Hrs  T(C): 35.9 (26 Apr 2018 05:27), Max: 36.2 (25 Apr 2018 17:17)  T(F): 96.6 (26 Apr 2018 05:27), Max: 97.2 (25 Apr 2018 17:17)  HR: 68 (26 Apr 2018 08:00) (62 - 78)  BP: 158/92 (26 Apr 2018 05:27) (146/84 - 169/89)  BP(mean): --  RR: 18 (26 Apr 2018 05:27) (17 - 18)  SpO2: 97% (26 Apr 2018 08:00) (95% - 100%)    General:  A & O x3, NAD  Neck:  No swelling or active bleeding  Lungs:  CTAB  Cardiovascular:  good S1, S2,   Abdomen:  Soft, non-tender, non-distended, normoactive bowel sounds, no HSM  Extremities:  no calf swelling/tenderness b/l        LABS:                        8.2    13.34 )-----------( 129      ( 25 Apr 2018 08:36 )             24.7     04-25    138  |  100  |  98<H>  ----------------------------<  130<H>  4.8   |  23  |  9.34<H>    Ca    8.1<L>      25 Apr 2018 08:36    TPro  6.1  /  Alb  2.6<L>  /  TBili  0.5  /  DBili  x   /  AST  20  /  ALT  47  /  AlkPhos  68  04-25          RADIOLOGY & ADDITIONAL STUDIES:  < from: Xray Chest 1 View AP/PA. (04.25.18 @ 15:00) >  EXAM:  XR CHEST AP OR PA 1V                            PROCEDURE DATE:  04/25/2018          INTERPRETATION:  CLINICAL STATEMENT: Follow-up chest pain.    TECHNIQUE: AP view of the chest.    COMPARISON: 4/22/2018    FINDINGS/  IMPRESSION:  Right dialysis catheter present with tip overlying the junction of SVC   and right atrium.    . No pneumothorax, consolidation or pleural effusion    Heart size cannot be accurately assessed in this projection.    < end of copied text >

## 2018-04-26 NOTE — PHYSICAL THERAPY INITIAL EVALUATION ADULT - IMPAIRMENTS FOUND, PT EVAL
ergonomics and body mechanics/posture/aerobic capacity/endurance/gait, locomotion, and balance/muscle strength

## 2018-04-26 NOTE — PROGRESS NOTE ADULT - SUBJECTIVE AND OBJECTIVE BOX
INTERVAL HPI/OVERNIGHT EVENTS:    s/p  SVC  cathater  placement    REVIEW OF SYSTEMS:  CONSTITUTIONAL:  difficulty  walking  secondary  to  knee / pain  fatigue    NECK: No pain or stiffnes  RESPIRATORY: No SOB   CARDIOVASCULAR: No chest pain, palpitations, dizziness,   GASTROINTESTINAL: No abdominal pain. No nausea, vomiting,   NEUROLOGICAL: No headaches, no  blurry  vision no  dizziness  SKIN: No itching,   MUSCULOSKELETAL:   mild  knee pain    MEDICATION:  acetaminophen   Tablet 650 milliGRAM(s) Oral every 6 hours PRN  acetaminophen   Tablet. 650 milliGRAM(s) Oral every 6 hours PRN  amLODIPine   Tablet 10 milliGRAM(s) Oral daily  AQUAPHOR (petrolatum Ointment) 1 Application(s) Topical two times a day  carvedilol 12.5 milliGRAM(s) Oral every 12 hours  cholecalciferol 400 Unit(s) Oral daily  cloNIDine 0.2 milliGRAM(s) Oral every 8 hours  predniSONE   Tablet 40 milliGRAM(s) Oral daily    Vital Signs Last 24 Hrs  T(C): 35.9 (26 Apr 2018 05:27), Max: 36.2 (25 Apr 2018 17:17)  T(F): 96.6 (26 Apr 2018 05:27), Max: 97.2 (25 Apr 2018 17:17)  HR: 76 (26 Apr 2018 06:37) (62 - 78)  BP: 158/92 (26 Apr 2018 05:27) (146/84 - 169/89)  BP(mean): --  RR: 18 (26 Apr 2018 05:27) (17 - 18)  SpO2: 97% (26 Apr 2018 06:37) (95% - 100%)    PHYSICAL EXAM:  GENERAL: NAD, well-groomed, well-developed  EYES:  conjunctiva and sclera clear  ENMT:  Moist mucous membranes,   NECK: Supple, No JVD, Normal thyroid  NERVOUS SYSTEM:  Alert oriented   no  focal  deficits;   CHEST/LUNG: Clear    HEART: Regular rate and rhythm; No murmurs, rubs, or gallops  ABDOMEN: Soft, Nontender, Nondistended; Bowel sounds present  EXTREMITIES:  no  edema no  tenderness  SKIN: No rashes   LABS:                        8.2    13.34 )-----------( 129      ( 25 Apr 2018 08:36 )             24.7     04-25    138  |  100  |  98<H>  ----------------------------<  130<H>  4.8   |  23  |  9.34<H>    Ca    8.1<L>      25 Apr 2018 08:36    TPro  6.1  /  Alb  2.6<L>  /  TBili  0.5  /  DBili  x   /  AST  20  /  ALT  47  /  AlkPhos  68  04-25        CAPILLARY BLOOD GLUCOSE          RADIOLOGY & ADDITIONAL TESTS:    Imaging reports  Personally Reviewed:  [x ] YES  [ ] NO    Consultant(s) Notes Reviewed:  [ x] YES  [ ] NO    Care Discussed with Consultants/Other Providers [ x] YES  [ ] NO  Problem/Plan - 1:  ·  Problem: Shortness of breath.  Plan: multi factorial  CKD  anemia  fluif  overload     Problem/Plan - 2:  ·  Problem: ESRF (end stage renal failure).  Plan: HD  as  per  nephrology.     Problem/Plan - 3:  ·  Problem: Anemia.  Plan: transfuse  as  needed.  for  GI  eval      Problem/Plan - 4:  ·  Problem: Hyperkalemia, diminished renal excretion.  Plan: insulin  calcium.     Problem/Plan - 5:  HTN  clonidine  Carvediol  norvasc  lisinopril  norvasc    GOUT  RT KNEE  PAIN  PREDNISONE awaiting  rheumatology  eval  for  PT  eval  for  AV  fistula  formation  today  will need  to  go  for  Rehab  and  continue  HD,  discusssed  with  patient  prefers  to  go  to  [facility]  for  Rehab  and  HD

## 2018-04-26 NOTE — CHART NOTE - NSCHARTNOTEFT_GEN_A_CORE
patient is seen and examined, the left upper extremity venous mapping is reviewed, I did the bedside ultrasound without pics to eval the patency of the left upper extremity veins, the cephalic veins are small, has areas of stenosis and narrowing in the forearm and arm, may not be a good cndidate for AVF. Most likely will need Graft. I have explained and discussed with the patient and he agrees and informed consent is obtained.

## 2018-04-26 NOTE — PHYSICAL THERAPY INITIAL EVALUATION ADULT - MANUAL MUSCLE TESTING RESULTS, REHAB EVAL
L hip 3+ & R hip 2+. L knee 4 & R knee 2+. L ankle 4+ & R ankle 3. L hip 3+ & R hip 2+. L knee 4 & R knee 2+. L ankle 4+ & R ankle 3. due to pain

## 2018-04-27 LAB
ANION GAP SERPL CALC-SCNC: 12 MMOL/L — SIGNIFICANT CHANGE UP (ref 5–17)
BUN SERPL-MCNC: 67 MG/DL — HIGH (ref 7–23)
CALCIUM SERPL-MCNC: 7.7 MG/DL — LOW (ref 8.5–10.1)
CHLORIDE SERPL-SCNC: 99 MMOL/L — SIGNIFICANT CHANGE UP (ref 96–108)
CO2 SERPL-SCNC: 26 MMOL/L — SIGNIFICANT CHANGE UP (ref 22–31)
CREAT SERPL-MCNC: 6.49 MG/DL — HIGH (ref 0.5–1.3)
GLUCOSE SERPL-MCNC: 108 MG/DL — HIGH (ref 70–99)
HCT VFR BLD CALC: 25 % — LOW (ref 39–50)
HGB BLD-MCNC: 8.4 G/DL — LOW (ref 13–17)
MCHC RBC-ENTMCNC: 27.4 PG — SIGNIFICANT CHANGE UP (ref 27–34)
MCHC RBC-ENTMCNC: 33.6 GM/DL — SIGNIFICANT CHANGE UP (ref 32–36)
MCV RBC AUTO: 81.4 FL — SIGNIFICANT CHANGE UP (ref 80–100)
NRBC # BLD: 0 /100 WBCS — SIGNIFICANT CHANGE UP (ref 0–0)
PLATELET # BLD AUTO: 119 K/UL — LOW (ref 150–400)
POTASSIUM SERPL-MCNC: 4.7 MMOL/L — SIGNIFICANT CHANGE UP (ref 3.5–5.3)
POTASSIUM SERPL-SCNC: 4.7 MMOL/L — SIGNIFICANT CHANGE UP (ref 3.5–5.3)
RBC # BLD: 3.07 M/UL — LOW (ref 4.2–5.8)
RBC # FLD: 14.1 % — SIGNIFICANT CHANGE UP (ref 10.3–14.5)
SODIUM SERPL-SCNC: 137 MMOL/L — SIGNIFICANT CHANGE UP (ref 135–145)
WBC # BLD: 11.73 K/UL — HIGH (ref 3.8–10.5)
WBC # FLD AUTO: 11.73 K/UL — HIGH (ref 3.8–10.5)

## 2018-04-27 RX ORDER — ACETAMINOPHEN 500 MG
1000 TABLET ORAL ONCE
Qty: 0 | Refills: 0 | Status: DISCONTINUED | OUTPATIENT
Start: 2018-04-27 | End: 2018-04-30

## 2018-04-27 RX ORDER — HYDROMORPHONE HYDROCHLORIDE 2 MG/ML
0.25 INJECTION INTRAMUSCULAR; INTRAVENOUS; SUBCUTANEOUS
Qty: 0 | Refills: 0 | Status: DISCONTINUED | OUTPATIENT
Start: 2018-04-27 | End: 2018-04-27

## 2018-04-27 RX ORDER — ONDANSETRON 8 MG/1
4 TABLET, FILM COATED ORAL ONCE
Qty: 0 | Refills: 0 | Status: DISCONTINUED | OUTPATIENT
Start: 2018-04-27 | End: 2018-04-27

## 2018-04-27 RX ADMIN — Medication 0.2 MILLIGRAM(S): at 22:17

## 2018-04-27 RX ADMIN — Medication 0.2 MILLIGRAM(S): at 14:24

## 2018-04-27 RX ADMIN — Medication 0.2 MILLIGRAM(S): at 05:59

## 2018-04-27 RX ADMIN — Medication 400 UNIT(S): at 14:24

## 2018-04-27 RX ADMIN — AMLODIPINE BESYLATE 10 MILLIGRAM(S): 2.5 TABLET ORAL at 05:59

## 2018-04-27 RX ADMIN — CARVEDILOL PHOSPHATE 12.5 MILLIGRAM(S): 80 CAPSULE, EXTENDED RELEASE ORAL at 17:23

## 2018-04-27 RX ADMIN — Medication 1 APPLICATION(S): at 06:00

## 2018-04-27 RX ADMIN — Medication 1 APPLICATION(S): at 17:22

## 2018-04-27 RX ADMIN — Medication 40 MILLIGRAM(S): at 05:59

## 2018-04-27 RX ADMIN — CARVEDILOL PHOSPHATE 12.5 MILLIGRAM(S): 80 CAPSULE, EXTENDED RELEASE ORAL at 05:59

## 2018-04-27 NOTE — PROGRESS NOTE ADULT - SUBJECTIVE AND OBJECTIVE BOX
Assessment:    Renal failure,  ESRD, Nephrology noted, needed to make necessary arrangements for long term HD  SOB AND symptomatology primarily renal  Patient states had stress test and CV w/u a few months ago for renal transplant consideration  All negative  Tolerating HD  Vasc noted  Post AVG

## 2018-04-27 NOTE — PROGRESS NOTE ADULT - SUBJECTIVE AND OBJECTIVE BOX
Patient feels well no complaints today.post avg placement    MEDICATIONS  (STANDING):  amLODIPine   Tablet 10 milliGRAM(s) Oral daily  AQUAPHOR (petrolatum Ointment) 1 Application(s) Topical two times a day  carvedilol 12.5 milliGRAM(s) Oral every 12 hours  cholecalciferol 400 Unit(s) Oral daily  cloNIDine 0.2 milliGRAM(s) Oral every 8 hours    MEDICATIONS  (PRN):  acetaminophen   Tablet 650 milliGRAM(s) Oral every 6 hours PRN For Temp greater than 38 C (100.4 F)  acetaminophen   Tablet. 650 milliGRAM(s) Oral every 6 hours PRN Mild Pain (1 - 3)  acetaminophen  IVPB. 1000 milliGRAM(s) IV Intermittent once PRN Moderate Pain (4 - 6)      04-26-18 @ 07:01  -  04-27-18 @ 07:00  --------------------------------------------------------  IN: 900 mL / OUT: 2750 mL / NET: -1850 mL    04-27-18 @ 07:01  -  04-27-18 @ 21:58  --------------------------------------------------------  IN: 250 mL / OUT: 500 mL / NET: -250 mL      PHYSICAL EXAM:      T(C): 36.9 (04-27-18 @ 21:30), Max: 36.9 (04-27-18 @ 21:30)  HR: 76 (04-27-18 @ 21:30) (61 - 97)  BP: 159/89 (04-27-18 @ 21:30) (123/77 - 173/99)  RR: 16 (04-27-18 @ 21:30) (12 - 19)  SpO2: 99% (04-27-18 @ 21:30) (95% - 100%)  Wt(kg): --  Respiratory: clear anteriorly, decreased BS at bases  Cardiovascular: S1 S2  Gastrointestinal: soft NT ND +BS  Extremities:  avf left edema                                    8.4    11.73 )-----------( 119      ( 27 Apr 2018 06:19 )             25.0     04-27    137  |  99  |  67<H>  ----------------------------<  108<H>  4.7   |  26  |  6.49<H>    Ca    7.7<L>      27 Apr 2018 06:19    TPro  6.2  /  Alb  2.6<L>  /  TBili  0.5  /  DBili  x   /  AST  38<H>  /  ALT  62  /  AlkPhos  79  04-26      LIVER FUNCTIONS - ( 26 Apr 2018 14:19 )  Alb: 2.6 g/dL / Pro: 6.2 gm/dL / ALK PHOS: 79 U/L / ALT: 62 U/L / AST: 38 U/L / GGT: x             Assessment and Plan:  HD or sat, discharge post pending course.

## 2018-04-27 NOTE — PROGRESS NOTE ADULT - SUBJECTIVE AND OBJECTIVE BOX
INTERVAL HPI/OVERNIGHT EVENTS:    patient  s/p  L  AV fistula  placement        REVIEW OF SYSTEMS:  CONSTITUTIONAL:        MEDICATION:  acetaminophen   Tablet 650 milliGRAM(s) Oral every 6 hours PRN  acetaminophen   Tablet. 650 milliGRAM(s) Oral every 6 hours PRN  acetaminophen  IVPB. 1000 milliGRAM(s) IV Intermittent once PRN  amLODIPine   Tablet 10 milliGRAM(s) Oral daily  AQUAPHOR (petrolatum Ointment) 1 Application(s) Topical two times a day  carvedilol 12.5 milliGRAM(s) Oral every 12 hours  cholecalciferol 400 Unit(s) Oral daily  cloNIDine 0.2 milliGRAM(s) Oral every 8 hours  HYDROmorphone  Injectable 0.25 milliGRAM(s) IV Push every 10 minutes PRN  ondansetron Injectable 4 milliGRAM(s) IV Push once PRN    Vital Signs Last 24 Hrs  T(C): 35.8 (27 Apr 2018 11:40), Max: 36.7 (26 Apr 2018 23:17)  T(F): 96.4 (27 Apr 2018 11:40), Max: 98 (26 Apr 2018 23:17)  HR: 73 (27 Apr 2018 12:10) (62 - 86)  BP: 140/86 (27 Apr 2018 12:10) (139/77 - 190/105)  BP(mean): --  RR: 19 (27 Apr 2018 12:10) (13 - 19)  SpO2: 96% (27 Apr 2018 12:10) (95% - 100%)    PHYSICAL EXAM:  Pt  examined  in  recovery room  somnolent  from  anasthesia  GENERAL: NAD,  EYES:  conjunctiva and sclera clear  ENMT:  Moist mucous membranes,   NECK: Supple, No JVD, Normal thyroid  NERVOUS SYSTEM:  Alert oriented   no  focal  deficits;   CHEST/LUNG: Clear    HEART: Regular rate and rhythm; No murmurs, rubs, or gallops  ABDOMEN: Soft, Nontender, Nondistended; Bowel sounds present  EXTREMITIES:  no  edema no  tenderness  SKIN: No rashes   LABS:                        8.4    11.73 )-----------( 119      ( 27 Apr 2018 06:19 )             25.0     04-27    137  |  99  |  67<H>  ----------------------------<  108<H>  4.7   |  26  |  6.49<H>    Ca    7.7<L>      27 Apr 2018 06:19    TPro  6.2  /  Alb  2.6<L>  /  TBili  0.5  /  DBili  x   /  AST  38<H>  /  ALT  62  /  AlkPhos  79  04-26    PT/INR - ( 26 Apr 2018 15:26 )   PT: 10.6 sec;   INR: 0.97 ratio         PTT - ( 26 Apr 2018 15:26 )  PTT:20.3 sec    CAPILLARY BLOOD GLUCOSE          RADIOLOGY & ADDITIONAL TESTS:    Imaging reports  Personally Reviewed:  [x ] YES  [ ] NO    Consultant(s) Notes Reviewed:  [x ] YES  [ ] NO    Care Discussed with Consultants/Other Providers [x ] YES  [ ] NO  Problem/Plan - 1:  ·  Problem: Shortness of breath.  Plan: multi factorial  CKD  anemia  fluif  overload     Problem/Plan - 2:  ·  Problem: ESRF (end stage renal failure).  Plan: HD  as  per  nephrology. S/P  av  fistula formation    Problem/Plan - 3:  ·  Problem: Anemia.  Plan: transfuse  as  needed.  for  GI  eval      Problem/Plan - 4:  ·  Problem: Hyperkalemia, diminished renal excretion.  Plan: insulin  calcium.     Problem/Plan - 5:  HTN  clonidine  Carvediol  norvasc  lisinopril  norvasc    GOUT  RT KNEE  PAIN  PREDNISONE

## 2018-04-27 NOTE — BRIEF OPERATIVE NOTE - PROCEDURE
<<-----Click on this checkbox to enter Procedure AV graft  04/27/2018  left arm  Active  MARIA ELENA

## 2018-04-27 NOTE — PROGRESS NOTE ADULT - SUBJECTIVE AND OBJECTIVE BOX
INTERVAL HPI/OVERNIGHT EVENTS:  no complaints. s/p L. av fistula    Vital Signs Last 24 Hrs  T(C): 36.8 (27 Apr 2018 17:39), Max: 36.8 (27 Apr 2018 17:39)  T(F): 98.3 (27 Apr 2018 17:39), Max: 98.3 (27 Apr 2018 17:39)  HR: 74 (27 Apr 2018 17:39) (61 - 97)  BP: 153/61 (27 Apr 2018 17:39) (123/77 - 173/99)  BP(mean): --  RR: 16 (27 Apr 2018 17:39) (12 - 19)  SpO2: 99% (27 Apr 2018 17:39) (95% - 100%)        PHYSICAL EXAM:  GEN:         Awake, responsive and comfortable.  HEENT:    Normal.    RESP:    mostly clear  CVS:             Regular rate and rhythm.   ABD:         Soft, non-tender, non-distended;   :             No costovertebral angle tenderness  EXTR:            No clubbing, cyanosis or edema  CNS:              Intact sensory and motor function.        MEDICATIONS  (STANDING):  amLODIPine   Tablet 10 milliGRAM(s) Oral daily  AQUAPHOR (petrolatum Ointment) 1 Application(s) Topical two times a day  carvedilol 12.5 milliGRAM(s) Oral every 12 hours  cholecalciferol 400 Unit(s) Oral daily  cloNIDine 0.2 milliGRAM(s) Oral every 8 hours    MEDICATIONS  (PRN):  acetaminophen   Tablet 650 milliGRAM(s) Oral every 6 hours PRN For Temp greater than 38 C (100.4 F)  acetaminophen   Tablet. 650 milliGRAM(s) Oral every 6 hours PRN Mild Pain (1 - 3)  acetaminophen  IVPB. 1000 milliGRAM(s) IV Intermittent once PRN Moderate Pain (4 - 6)        LABS:                        8.4    11.73 )-----------( 119      ( 27 Apr 2018 06:19 )             25.0     04-27    137  |  99  |  67<H>  ----------------------------<  108<H>  4.7   |  26  |  6.49<H>    Ca    7.7<L>      27 Apr 2018 06:19    TPro  6.2  /  Alb  2.6<L>  /  TBili  0.5  /  DBili  x   /  AST  38<H>  /  ALT  62  /  AlkPhos  79  04-26    PT/INR - ( 26 Apr 2018 15:26 )   PT: 10.6 sec;   INR: 0.97 ratio         PTT - ( 26 Apr 2018 15:26 )  PTT:20.3 sec          RADIOLOGY & ADDITIONAL STUDIES:    < from: Xray Chest 1 View AP/PA. (04.25.18 @ 15:00) >  EXAM:  XR CHEST AP OR PA 1V                            PROCEDURE DATE:  04/25/2018          INTERPRETATION:  CLINICAL STATEMENT: Follow-up chest pain.    TECHNIQUE: AP view of the chest.    COMPARISON: 4/22/2018    FINDINGS/  IMPRESSION:  Right dialysis catheter present with tip overlying the junction of SVC   and right atrium.    . No pneumothorax, consolidation or pleural effusion    Heart size cannot be accurately assessed in this projection.                  CARMEN MCKEON M.D., ATTENDING RADIOLOGIST      < end of copied text >  ASSESSMENT AND PLAN: resolution of retrocardiac process. renal mgmt

## 2018-04-28 ENCOUNTER — TRANSCRIPTION ENCOUNTER (OUTPATIENT)
Age: 58
End: 2018-04-28

## 2018-04-28 LAB
CULTURE RESULTS: SIGNIFICANT CHANGE UP
CULTURE RESULTS: SIGNIFICANT CHANGE UP
SPECIMEN SOURCE: SIGNIFICANT CHANGE UP
SPECIMEN SOURCE: SIGNIFICANT CHANGE UP

## 2018-04-28 RX ORDER — METOPROLOL TARTRATE 50 MG
1 TABLET ORAL
Qty: 0 | Refills: 0 | COMMUNITY

## 2018-04-28 RX ORDER — ACETAMINOPHEN 500 MG
2 TABLET ORAL
Qty: 0 | Refills: 0 | COMMUNITY
Start: 2018-04-28

## 2018-04-28 RX ORDER — LISINOPRIL 2.5 MG/1
0 TABLET ORAL
Qty: 0 | Refills: 0 | COMMUNITY

## 2018-04-28 RX ORDER — CHOLECALCIFEROL (VITAMIN D3) 125 MCG
0 CAPSULE ORAL
Qty: 0 | Refills: 0 | COMMUNITY

## 2018-04-28 RX ORDER — ACETAMINOPHEN 500 MG
100 TABLET ORAL
Qty: 0 | Refills: 0 | COMMUNITY
Start: 2018-04-28

## 2018-04-28 RX ORDER — CARVEDILOL PHOSPHATE 80 MG/1
1 CAPSULE, EXTENDED RELEASE ORAL
Qty: 60 | Refills: 0 | OUTPATIENT
Start: 2018-04-28 | End: 2018-05-27

## 2018-04-28 RX ORDER — ZOLPIDEM TARTRATE 10 MG/1
5 TABLET ORAL
Qty: 0 | Refills: 0 | COMMUNITY

## 2018-04-28 RX ADMIN — Medication 0.2 MILLIGRAM(S): at 05:33

## 2018-04-28 RX ADMIN — Medication 0.2 MILLIGRAM(S): at 13:30

## 2018-04-28 RX ADMIN — Medication 0.2 MILLIGRAM(S): at 22:12

## 2018-04-28 RX ADMIN — Medication 400 UNIT(S): at 13:30

## 2018-04-28 RX ADMIN — CARVEDILOL PHOSPHATE 12.5 MILLIGRAM(S): 80 CAPSULE, EXTENDED RELEASE ORAL at 05:33

## 2018-04-28 RX ADMIN — Medication 1 APPLICATION(S): at 05:33

## 2018-04-28 RX ADMIN — AMLODIPINE BESYLATE 10 MILLIGRAM(S): 2.5 TABLET ORAL at 05:33

## 2018-04-28 NOTE — CHART NOTE - NSCHARTNOTEFT_GEN_A_CORE
Th ept is seen and examined. POD 1 s/p lft arm AVG, the graft is patent, no bleeding and no he,atoma and the graft with goo Bruit  and thrill. Left hand is warm and the radial pulse is ++. Patient is cleared for discharge. Can f/u with me as needed in the Office.

## 2018-04-28 NOTE — DIETITIAN INITIAL EVALUATION ADULT. - OTHER INFO
Pt seen for LOS. Pt lives at home c daughter; independent c ADL; supportive Mom; new HD pt.  No N/V/C/D or chew/swallowing difficulty reported. Reviewed renal diet recommendations; recommended outpatient RD counseling if covered by insurance co; also advised RD at outpatient dialysis center will provide guidance.

## 2018-04-28 NOTE — DISCHARGE NOTE ADULT - PLAN OF CARE
avoidance of  recurrence  mo continue  HD  as  per  nephrology  might  nee d  procrit  for further GI  eval  as  ou pt

## 2018-04-28 NOTE — DISCHARGE NOTE ADULT - MEDICATION SUMMARY - MEDICATIONS TO STOP TAKING
I will STOP taking the medications listed below when I get home from the hospital:    Toprol-XL  -- orally once a day    lisinopril  -- orally once a day    metoprolol succinate 100 mg oral tablet, extended release  -- 1 tab(s) by mouth once a day

## 2018-04-28 NOTE — PROGRESS NOTE ADULT - SUBJECTIVE AND OBJECTIVE BOX
Assessment:    Renal failure,  ESRD, Nephrology noted, needed to make necessary arrangements for long term HD  SOB AND symptomatology primarily renal  Patient states had stress test and CV w/u a few months ago for renal transplant consideration  All negative  Tolerating HD  Vasc noted  Perma cath Assessment:    Renal failure,  ESRD, Nephrology noted, needed to make necessary arrangements for long term HD  SOB AND symptomatology primarily renal  Patient states had stress test and CV w/u a few months ago for renal transplant consideration  All negative  Tolerating HD  Vasc noted  Post AVG

## 2018-04-28 NOTE — DISCHARGE NOTE ADULT - PATIENT PORTAL LINK FT
You can access the [facility], offered by [facility], by registering with the following website: [email or web address]

## 2018-04-28 NOTE — DIETITIAN INITIAL EVALUATION ADULT. - NS AS NUTRI INTERV ED CONTENT
Recommended modifications/Nutritional counseling/educational material provided/Nutrition relationship to health/disease

## 2018-04-28 NOTE — CHART NOTE - NSCHARTNOTEFT_GEN_A_CORE
Pt seen bedside with Dr Carroll. No complaints offered. POD#1 s/p LUE AVG.   Left upper extremity AVG incisions with steri strips, mild oozing noted, dry dressing applied. +Good thrill and bruit. Left radial pulse 2+.  Continue medical management.

## 2018-04-28 NOTE — DISCHARGE NOTE ADULT - SECONDARY DIAGNOSIS.
Pleural effusion ESRF (end stage renal failure) Hyperkalemia, diminished renal excretion Shortness of breath HTN (hypertension) Hepatitis B infection without delta agent without hepatic coma, unspecified chronicity

## 2018-04-28 NOTE — DISCHARGE NOTE ADULT - CARE PLAN
Principal Discharge DX:	Anemia, unspecified type  Goal:	avoidance of  recurrence  mo  Assessment and plan of treatment:	continue  HD  as  per  nephrology  might  nee d  procrit  for further GI  eval  as  ou pt  Secondary Diagnosis:	Pleural effusion  Secondary Diagnosis:	ESRF (end stage renal failure)  Secondary Diagnosis:	Hyperkalemia, diminished renal excretion  Secondary Diagnosis:	Shortness of breath  Secondary Diagnosis:	HTN (hypertension)  Secondary Diagnosis:	Hepatitis B infection without delta agent without hepatic coma, unspecified chronicity

## 2018-04-28 NOTE — DIETITIAN INITIAL EVALUATION ADULT. - PERTINENT LABORATORY DATA
04-27 Na137 mmol/L Glu 108 mg/dL<H> K+ 4.7 mmol/L Cr  6.49 mg/dL<H> BUN 67 mg/dL<H> 04-26 Alb 2.6 g/dL<L>

## 2018-04-28 NOTE — DISCHARGE NOTE ADULT - HOSPITAL COURSE
patient  treated  on  telemetry was  transfused  3  UPRBCs   underwent  HD  had  superior  VC  cathater  inserted  along  with  AV fistula formation on L arm  patient  clinically  improved  had  episode o  L  knee  pain  swelling  thought  to  be  secondary  to Gout  attack  treated  with prednisone evalauted  by  PT  was  able  to  ambulate  with  walker  was  evaluated  by  GI  for finding of HBAg +  with  viral  load  8,289  to follow  with  GI  for  further w/u  and  treatment  for  anemia  and Chronic  Hepatitis  B.  patient  to  be discharged  home  with  home  PT  after  HD  session  of  4/28/18

## 2018-04-28 NOTE — DISCHARGE NOTE ADULT - CARE PROVIDER_API CALL
Mariusz Verdugo), Internal Medicine; Nephrology  [address]  [address]  [place], [place] [id number]  Phone: [phone]  Fax: [phone]    Horacio Rooney), Medicine  [address]  [address]  [place] [address]  Phone: [phone]  Fax: [phone]    PMD,   Phone: (   )    -  Fax: (   )    -

## 2018-04-28 NOTE — DIETITIAN INITIAL EVALUATION ADULT. - NUTRITION INTERVENTION
Nutrition Education/Medical Food Supplements/Meals and Snack Nutrition Education/Medical Food Supplements/Vitamin/Meals and Snack

## 2018-04-28 NOTE — DISCHARGE NOTE ADULT - MEDICATION SUMMARY - MEDICATIONS TO TAKE
I will START or STAY ON the medications listed below when I get home from the hospital:    predniSONE 20 mg oral tablet  -- 1 tab(s) by mouth once a day, As Needed  -- Indication: For gout    acetaminophen 325 mg oral tablet  -- 2 tab(s) by mouth every 6 hours, As needed, Mild Pain (1 - 3)  -- Indication: For Pain    cloNIDine 0.2 mg oral tablet  -- 1 tab(s) by mouth 2 times a day  -- Indication: For HTN (hypertension)    allopurinol 100 mg oral tablet  -- 1 tab(s) by mouth 2 times a day  -- Indication: For gout    carvedilol 12.5 mg oral tablet  -- 1 tab(s) by mouth every 12 hours  -- Indication: For HTN (hypertension)    amLODIPine  -- orally once a day  -- Indication: For HTN (hypertension)    sevelamer carbonate 800 mg oral tablet  -- 1 tab(s) by mouth 3 times a day (with meals)  -- Indication: For ESRF (end stage renal failure)    calcitriol 0.25 mcg oral capsule  -- orally 3 times a week mon, wed,fri  -- Indication: For ESRF (end stage renal failure)

## 2018-04-28 NOTE — DIETITIAN INITIAL EVALUATION ADULT. - ENERGY NEEDS
Height (cm): 182.88 (04-27)  Weight (kg): 100.7 (04-27)  BMI (kg/m2): 30.1 (04-27)  IBW:  80.9 kg +/- 10%     % IBW:   124%    UBW:  stable      %UBW: 100%

## 2018-04-28 NOTE — PROGRESS NOTE ADULT - SUBJECTIVE AND OBJECTIVE BOX
INTERVAL HPI/OVERNIGHT EVENTS:        REVIEW OF SYSTEMS:  CONSTITUTIONAL:  feels  well presents no  complaints    NECK: No pain or stiffnes  RESPIRATORY: No SOB   CARDIOVASCULAR: No chest pain, palpitations, dizziness,   GASTROINTESTINAL: No abdominal pain. No nausea, vomiting,   NEUROLOGICAL: No headaches, no  blurry  vision no  dizziness  SKIN: No itching,   MUSCULOSKELETAL: No pain    MEDICATION:  acetaminophen   Tablet 650 milliGRAM(s) Oral every 6 hours PRN  acetaminophen   Tablet. 650 milliGRAM(s) Oral every 6 hours PRN  acetaminophen  IVPB. 1000 milliGRAM(s) IV Intermittent once PRN  amLODIPine   Tablet 10 milliGRAM(s) Oral daily  AQUAPHOR (petrolatum Ointment) 1 Application(s) Topical two times a day  carvedilol 12.5 milliGRAM(s) Oral every 12 hours  cholecalciferol 400 Unit(s) Oral daily  cloNIDine 0.2 milliGRAM(s) Oral every 8 hours    Vital Signs Last 24 Hrs  T(C): 36.9 (28 Apr 2018 04:00), Max: 37 (27 Apr 2018 23:47)  T(F): 98.4 (28 Apr 2018 04:00), Max: 98.6 (27 Apr 2018 23:47)  HR: 67 (28 Apr 2018 06:19) (61 - 97)  BP: 161/90 (28 Apr 2018 04:00) (123/77 - 161/90)  BP(mean): --  RR: 17 (28 Apr 2018 04:00) (12 - 19)  SpO2: 97% (28 Apr 2018 06:19) (96% - 100%)    PHYSICAL EXAM:  GENERAL: NAD, well-groomed, well-developed  EYES:  conjunctiva and sclera clear  ENMT:  Moist mucous membranes,   NECK: Supple, No JVD, Normal thyroid  NERVOUS SYSTEM:  Alert oriented   no  focal  deficits;   CHEST/LUNG: Clear    HEART: Regular rate and rhythm; No murmurs, rubs, or gallops  ABDOMEN: Soft, Nontender, Nondistended; Bowel sounds present  EXTREMITIES:  no  edema no  tenderness  SKIN: No rashes   LABS:                        8.4    11.73 )-----------( 119      ( 27 Apr 2018 06:19 )             25.0     04-27    137  |  99  |  67<H>  ----------------------------<  108<H>  4.7   |  26  |  6.49<H>    Ca    7.7<L>      27 Apr 2018 06:19    TPro  6.2  /  Alb  2.6<L>  /  TBili  0.5  /  DBili  x   /  AST  38<H>  /  ALT  62  /  AlkPhos  79  04-26    PT/INR - ( 26 Apr 2018 15:26 )   PT: 10.6 sec;   INR: 0.97 ratio         PTT - ( 26 Apr 2018 15:26 )  PTT:20.3 sec    CAPILLARY BLOOD GLUCOSE          RADIOLOGY & ADDITIONAL TESTS:    Imaging reports  Personally Reviewed:  [ x] YES  [ ] NO    Consultant(s) Notes Reviewed:  [x ] YES  [ ] NO    Care Discussed with Consultants/Other Providers [x ] YES  [ ] NO  Problem/Plan - 1:  ·  Problem: Shortness of breath.  Plan: multi factorial  CKD  anemia  fluif  overload     Problem/Plan - 2:  ·  Problem: ESRF (end stage renal failure).  Plan: HD  as  per  nephrology. S/P  av  fistula formation    Problem/Plan - 3:  ·  Problem: Anemia.  Plan: transfuse  as  needed.  for  GI  eval      Problem/Plan - 4:  ·  Problem: Hyperkalemia, diminished renal excretion.  Plan: insulin  calcium.     Problem/Plan - 5:  HTN  clonidine  Carvediol  norvasc    norvasc    GOUT  RT KNEE  PAIN  resolved

## 2018-04-29 RX ORDER — OXYCODONE AND ACETAMINOPHEN 5; 325 MG/1; MG/1
1 TABLET ORAL ONCE
Qty: 0 | Refills: 0 | Status: DISCONTINUED | OUTPATIENT
Start: 2018-04-29 | End: 2018-04-29

## 2018-04-29 RX ADMIN — Medication 0.2 MILLIGRAM(S): at 05:38

## 2018-04-29 RX ADMIN — OXYCODONE AND ACETAMINOPHEN 1 TABLET(S): 5; 325 TABLET ORAL at 06:12

## 2018-04-29 RX ADMIN — CARVEDILOL PHOSPHATE 12.5 MILLIGRAM(S): 80 CAPSULE, EXTENDED RELEASE ORAL at 18:28

## 2018-04-29 RX ADMIN — OXYCODONE AND ACETAMINOPHEN 1 TABLET(S): 5; 325 TABLET ORAL at 07:12

## 2018-04-29 RX ADMIN — Medication 0.2 MILLIGRAM(S): at 22:31

## 2018-04-29 RX ADMIN — Medication 1 APPLICATION(S): at 18:33

## 2018-04-29 RX ADMIN — AMLODIPINE BESYLATE 10 MILLIGRAM(S): 2.5 TABLET ORAL at 05:38

## 2018-04-29 RX ADMIN — CARVEDILOL PHOSPHATE 12.5 MILLIGRAM(S): 80 CAPSULE, EXTENDED RELEASE ORAL at 05:38

## 2018-04-29 RX ADMIN — Medication 0.2 MILLIGRAM(S): at 13:56

## 2018-04-29 RX ADMIN — OXYCODONE AND ACETAMINOPHEN 1 TABLET(S): 5; 325 TABLET ORAL at 11:47

## 2018-04-29 RX ADMIN — Medication 1 APPLICATION(S): at 05:38

## 2018-04-29 RX ADMIN — OXYCODONE AND ACETAMINOPHEN 1 TABLET(S): 5; 325 TABLET ORAL at 12:15

## 2018-04-29 RX ADMIN — Medication 400 UNIT(S): at 11:48

## 2018-04-29 NOTE — PROGRESS NOTE ADULT - SUBJECTIVE AND OBJECTIVE BOX
INTERVAL HPI/OVERNIGHT EVENTS:    patient  refused  discharge  with  home  PT  om 4/28/18 states feels  weak  and  knee  continues  to  hurt with  ambulation      REVIEW OF SYSTEMS:  CONSTITUTIONAL:  no  complaints    NECK: No pain or stiffnes  RESPIRATORY: No SOB   CARDIOVASCULAR: No chest pain, palpitations, dizziness,   GASTROINTESTINAL: No abdominal pain. No nausea, vomiting,   NEUROLOGICAL: No headaches, no  blurry  vision no  dizziness  SKIN: No itching,   MUSCULOSKELETAL:  mild  diffuse   MEDICATION:  acetaminophen   Tablet 650 milliGRAM(s) Oral every 6 hours PRN  acetaminophen   Tablet. 650 milliGRAM(s) Oral every 6 hours PRN  acetaminophen  IVPB. 1000 milliGRAM(s) IV Intermittent once PRN  amLODIPine   Tablet 10 milliGRAM(s) Oral daily  AQUAPHOR (petrolatum Ointment) 1 Application(s) Topical two times a day  carvedilol 12.5 milliGRAM(s) Oral every 12 hours  cholecalciferol 400 Unit(s) Oral daily  cloNIDine 0.2 milliGRAM(s) Oral every 8 hours    Vital Signs Last 24 Hrs  T(C): 36.9 (29 Apr 2018 05:30), Max: 36.9 (28 Apr 2018 19:20)  T(F): 98.4 (29 Apr 2018 05:30), Max: 98.5 (28 Apr 2018 19:20)  HR: 87 (29 Apr 2018 05:46) (72 - 94)  BP: 176/89 (29 Apr 2018 05:30) (147/78 - 176/89)  BP(mean): --  RR: 16 (29 Apr 2018 05:30) (16 - 18)  SpO2: 98% (29 Apr 2018 05:46) (97% - 100%)    PHYSICAL EXAM:  GENERAL: NAD, well-groomed, well-developed  EYES:  conjunctiva and sclera clear  ENMT:  Moist mucous membranes,   NECK: Supple, No JVD, Normal thyroid  NERVOUS SYSTEM:  Alert oriented   no  focal  deficits;   CHEST/LUNG: Clear    HEART: Regular rate and rhythm; No murmurs, rubs, or gallops  ABDOMEN: Soft, Nontender, Nondistended; Bowel sounds present  EXTREMITIES:  no  edema no  tenderness  SKIN: No rashes   LABS:              CAPILLARY BLOOD GLUCOSE          RADIOLOGY & ADDITIONAL TESTS:    Imaging reports  Personally Reviewed:  [x ] YES  [ ] NO    Consultant(s) Notes Reviewed:  [x ] YES  [ ] NO    Care Discussed with Consultants/Other Providers [ x] YES  [ ] NO  Problem/Plan - 1:  ·  Problem: Shortness of breath.  Plan: multi factorial  CKD  anemia  fluif  overload     Problem/Plan - 2:  ·  Problem: ESRF (end stage renal failure).  Plan: HD  as  per  nephrology. S/P  av  fistula formation    Problem/Plan - 3:  ·  Problem: Anemia.  Plan: transfuse  as  needed.  for  GI  eval      Problem/Plan - 4:  ·  Problem: Hyperkalemia, diminished renal excretion.  Plan: insulin  calcium.     Problem/Plan - 5:  HTN  clonidine  Carvediol  norvasc    norvasc    GOUT ATTACK RT KNEE    RESOLVED  mild  pain  persists  for  PT  re evaluation

## 2018-04-29 NOTE — PROGRESS NOTE ADULT - SUBJECTIVE AND OBJECTIVE BOX
FAROOQ DOWD  57y  Male    Patient is a 57y old  Male who presents with a chief complaint of BEE  PLEURAL  EFFUSION  ANEMIA RENAL  FAILURE (28 Apr 2018 07:46)      out of bed to chair.   breathing much better  had stable dialysis via perma cath   left AVF with good bruit and thrill.          PHYSICAL EXAM:    T(C): 37 (04-29-18 @ 11:52), Max: 37 (04-29-18 @ 11:52)  HR: 79 (04-29-18 @ 11:52) (72 - 94)  BP: 133/77 (04-29-18 @ 11:52) (107/63 - 176/89)  RR: 16 (04-29-18 @ 11:52) (16 - 18)  SpO2: 99% (04-29-18 @ 11:52) (97% - 100%)  Wt(kg): --    I&O's Detail    28 Apr 2018 07:01  -  29 Apr 2018 07:00  --------------------------------------------------------  IN:  Total IN: 0 mL    OUT:    Other: 1500 mL  Total OUT: 1500 mL    Total NET: -1500 mL          Respiratory: clear anteriorly, decreased BS at bases  Cardiovascular: S1 S2  Gastrointestinal: soft NT ND +BS  Extremities: edema   Neuro: Awake and alert    MEDICATIONS  (STANDING):  amLODIPine   Tablet 10 milliGRAM(s) Oral daily  AQUAPHOR (petrolatum Ointment) 1 Application(s) Topical two times a day  carvedilol 12.5 milliGRAM(s) Oral every 12 hours  cholecalciferol 400 Unit(s) Oral daily  cloNIDine 0.2 milliGRAM(s) Oral every 8 hours    MEDICATIONS  (PRN):  acetaminophen   Tablet 650 milliGRAM(s) Oral every 6 hours PRN For Temp greater than 38 C (100.4 F)  acetaminophen   Tablet. 650 milliGRAM(s) Oral every 6 hours PRN Mild Pain (1 - 3)  acetaminophen  IVPB. 1000 milliGRAM(s) IV Intermittent once PRN Moderate Pain (4 - 6)

## 2018-04-30 VITALS
DIASTOLIC BLOOD PRESSURE: 72 MMHG | OXYGEN SATURATION: 97 % | SYSTOLIC BLOOD PRESSURE: 138 MMHG | HEART RATE: 71 BPM | RESPIRATION RATE: 15 BRPM

## 2018-04-30 RX ADMIN — CARVEDILOL PHOSPHATE 12.5 MILLIGRAM(S): 80 CAPSULE, EXTENDED RELEASE ORAL at 05:40

## 2018-04-30 RX ADMIN — AMLODIPINE BESYLATE 10 MILLIGRAM(S): 2.5 TABLET ORAL at 05:40

## 2018-04-30 RX ADMIN — Medication 400 UNIT(S): at 11:40

## 2018-04-30 RX ADMIN — Medication 0.2 MILLIGRAM(S): at 13:07

## 2018-04-30 RX ADMIN — Medication 1 APPLICATION(S): at 05:40

## 2018-04-30 RX ADMIN — Medication 1 APPLICATION(S): at 17:06

## 2018-04-30 RX ADMIN — Medication 0.2 MILLIGRAM(S): at 05:40

## 2018-04-30 RX ADMIN — CARVEDILOL PHOSPHATE 12.5 MILLIGRAM(S): 80 CAPSULE, EXTENDED RELEASE ORAL at 17:06

## 2018-04-30 NOTE — PROGRESS NOTE ADULT - PROBLEM SELECTOR PROBLEM 2
ESRF (end stage renal failure)
Anemia, unspecified type
Hepatitis B infection without delta agent without hepatic coma, unspecified chronicity
Hepatitis B infection without delta agent without hepatic coma, unspecified chronicity

## 2018-04-30 NOTE — PROGRESS NOTE ADULT - PROVIDER SPECIALTY LIST ADULT
Cardiology
Gastroenterology
Gastroenterology
Internal Medicine
Intervent Radiology
Nephrology
Pulmonology
Rehab Medicine
Rehab Medicine
Surgery
Vascular Surgery
Vascular Surgery
Gastroenterology
Internal Medicine
Rehab Medicine

## 2018-04-30 NOTE — PROGRESS NOTE ADULT - ASSESSMENT
57 male with a history of EMMETT on CKD now ESRD and started on Dialysis.   Perma cath works well. Tolerating fluid removal. Feels better hemodynamcally.   medications and labs reviewed.
57 male with a history of EMMETT on CKD now ESRD and started on Dialysis.   Perma cath works well. Tolerating fluid removal. Feels better hemodynamcally.   medications and labs reviewed. next dialysis is on tuesday and discharge planning to [facility]
58 yo male s/p Fluoroscopic and ultrasound guided tunneled dialysis catheter.  Pt is now ESRD.
ANEMIA ,  Hepatitis B surface antigen
ANEMIA ,  Hepatitis B surface antigen +
ANEMIA ,  Hepatitis B surface antigen + with elevated VIRAL LOAD

## 2018-04-30 NOTE — PROGRESS NOTE ADULT - PROBLEM SELECTOR PLAN 1
multi factorial  CKD  anemia  fluif  overloa
-Management as per nephrology
Treatment if viral load is elevated.
outpatient screening colonoscopy.
outpatient screening colonoscopy.

## 2018-04-30 NOTE — PROGRESS NOTE ADULT - PROBLEM SELECTOR PLAN 2
Normal LFTS, Viral load is elevated and will start antiviral therapy as outpatient please recall as [facility]
HD  as  per  nephrology
Normal LFTS, Viral load pending
outpatient screening colonoscopy.

## 2018-04-30 NOTE — PROGRESS NOTE ADULT - SUBJECTIVE AND OBJECTIVE BOX
INTERVAL HPI/OVERNIGHT EVENTS:    reportedly  took  own  prednisone  yesterday      REVIEW OF SYSTEMS:  CONSTITUTIONAL:  feels  better  ambulating  in  hallway      NECK: No pain or stiffnes  RESPIRATORY: No SOB   CARDIOVASCULAR: No chest pain, palpitations, dizziness,   GASTROINTESTINAL: No abdominal pain. No nausea, vomiting,   NEUROLOGICAL: No headaches, no  blurry  vision no  dizziness  SKIN: No itching,   MUSCULOSKELETAL: No pain    MEDICATION:  acetaminophen   Tablet 650 milliGRAM(s) Oral every 6 hours PRN  acetaminophen   Tablet. 650 milliGRAM(s) Oral every 6 hours PRN  acetaminophen  IVPB. 1000 milliGRAM(s) IV Intermittent once PRN  amLODIPine   Tablet 10 milliGRAM(s) Oral daily  AQUAPHOR (petrolatum Ointment) 1 Application(s) Topical two times a day  carvedilol 12.5 milliGRAM(s) Oral every 12 hours  cholecalciferol 400 Unit(s) Oral daily  cloNIDine 0.2 milliGRAM(s) Oral every 8 hours    Vital Signs Last 24 Hrs  T(C): 36.6 (30 Apr 2018 05:42), Max: 37 (29 Apr 2018 11:52)  T(F): 97.8 (30 Apr 2018 05:42), Max: 98.6 (29 Apr 2018 11:52)  HR: 70 (30 Apr 2018 05:42) (70 - 92)  BP: 162/88 (30 Apr 2018 05:42) (133/77 - 162/88)  BP(mean): --  RR: 16 (30 Apr 2018 05:42) (16 - 18)  SpO2: 99% (30 Apr 2018 05:42) (97% - 99%)    PHYSICAL EXAM:  GENERAL: NAD, well-groomed, well-developed  EYES:  conjunctiva and sclera clear  ENMT:  Moist mucous membranes,   NECK: Supple, No JVD, Normal thyroid  NERVOUS SYSTEM:  Alert oriented   no  focal  deficits;   CHEST/LUNG: Clear    HEART: Regular rate and rhythm; No murmurs, rubs, or gallops  ABDOMEN: Soft, Nontender, Nondistended; Bowel sounds present  EXTREMITIES:  no  edema no  tenderness  SKIN: No rashes   LABS:              CAPILLARY BLOOD GLUCOSE          RADIOLOGY & ADDITIONAL TESTS:    Imaging reports  Personally Reviewed:  [ x] YES  [ ] NO    Consultant(s) Notes Reviewed:  [ x] YES  [ ] NO    Care Discussed with Consultants/Other Providers [x ] YES  [ ] NO  Problem/Plan - 1:  ·  Problem: Shortness of breath.  Plan: multi factorial  CKD  anemia  fluif  overload     Problem/Plan - 2:  ·  Problem: ESRF (end stage renal failure).  Plan: HD  as  per  nephrology. S/P  av  fistula formation    Problem/Plan - 3:  ·  Problem: Anemia.  Plan: observation  further  w/u  and  treatment  as  out  pt    Problem/Plan - 4:  ·  Problem: Hyperkalemia, diminished renal excretion.  Plan:  resolved    Problem/Plan - 5:  HTN  clonidine  Carvediol  norvasc   GOUT ATTACK RT KNEE    RESOLVED   discharge  greg e as  per  original  plan  d/c  PT  re evaluation

## 2018-04-30 NOTE — PROGRESS NOTE ADULT - SUBJECTIVE AND OBJECTIVE BOX
Sleeping comfortably.    MEDICATIONS  (STANDING):  amLODIPine   Tablet 10 milliGRAM(s) Oral daily  AQUAPHOR (petrolatum Ointment) 1 Application(s) Topical two times a day  carvedilol 12.5 milliGRAM(s) Oral every 12 hours  cholecalciferol 400 Unit(s) Oral daily  cloNIDine 0.2 milliGRAM(s) Oral every 8 hours    MEDICATIONS  (PRN):  acetaminophen   Tablet 650 milliGRAM(s) Oral every 6 hours PRN For Temp greater than 38 C (100.4 F)  acetaminophen   Tablet. 650 milliGRAM(s) Oral every 6 hours PRN Mild Pain (1 - 3)  acetaminophen  IVPB. 1000 milliGRAM(s) IV Intermittent once PRN Moderate Pain (4 - 6)      04-29-18 @ 07:01  -  04-30-18 @ 07:00  --------------------------------------------------------  IN: 0 mL / OUT: 320 mL / NET: -320 mL    04-30-18 @ 07:01  -  04-30-18 @ 13:31  --------------------------------------------------------  IN: 825 mL / OUT: 0 mL / NET: 825 mL      PHYSICAL EXAM:      T(C): 36.5 (04-30-18 @ 11:40), Max: 36.8 (04-29-18 @ 17:56)  HR: 81 (04-30-18 @ 11:40) (70 - 88)  BP: 159/86 (04-30-18 @ 11:40) (144/79 - 162/88)  RR: 17 (04-30-18 @ 11:40) (16 - 18)  SpO2: 98% (04-30-18 @ 11:40) (97% - 99%)  Wt(kg): --  Respiratory: clear anteriorly, decreased BS at bases  Cardiovascular: S1 S2  Gastrointestinal: soft NT ND +BS  Extremities:  tr edema  + avf                            Assessment and Plan:  ESRD for outpatient dialysis tomorrow.

## 2018-04-30 NOTE — PROGRESS NOTE ADULT - SUBJECTIVE AND OBJECTIVE BOX
Assessment:    Renal failure,  ESRD, Nephrology noted, needed to make necessary arrangements for long term HD  SOB AND symptomatology primarily renal  Patient states had stress test and CV w/u a few months ago for renal transplant consideration  All negative  Tolerating HD  Vasc noted  Post AVG  DC plan

## 2018-04-30 NOTE — PROGRESS NOTE ADULT - SUBJECTIVE AND OBJECTIVE BOX
Gastroenterology  Patient seen and examined bedside resting comfortably.  No complaints offered.   No abdominal pain  Denies nausea and vomiting. Tolerating diet.  Normal flatus/BM.     T(F): 97.8 (04-30-18 @ 05:42), Max: 98.6 (04-29-18 @ 11:52)  HR: 70 (04-30-18 @ 05:42) (70 - 88)  BP: 162/88 (04-30-18 @ 05:42) (133/77 - 162/88)  RR: 16 (04-30-18 @ 05:42) (16 - 18)  SpO2: 99% (04-30-18 @ 05:42) (97% - 99%)  Wt(kg): --  CAPILLARY BLOOD GLUCOSE          PHYSICAL EXAM:  General: NAD, WDWN.   Neuro:  Alert & oriented x 3  HEENT: NCAT, EOMI, conjunctiva clear  CV: +S1+S2 regular rate and rhythm  Lung: clear to ausculation bilaterally, respirations nonlabored, good inspiratory effort  Abdomen: soft, NonTender, No distention Normal active BS  Extremities: no cyanosis, clubbing or edema    LABS:              I&O's Detail    29 Apr 2018 07:01  -  30 Apr 2018 07:00  --------------------------------------------------------  IN:  Total IN: 0 mL    OUT:    Voided: 320 mL  Total OUT: 320 mL    Hepatitis B PCR Quantitative (04.25.18 @ 09:55)    Hepatitis B PCR Quantitative: 8289 IU/mL    Hepatitis B DNA PCR Log: 3.92: HBV DNA Quantification by Real Time PCR using Abbott m2000:  Assay dynamic range:  15 IU/mL to 1,000,000,000 HBV DNA IU/mL  1.18 (log10) IU/mL to 9.00(log10) IU/mL  Assay reference range: Not Detected  The results of this test should be interpreted with consideration of all  clinical and laboratory findings. A result of  No HBV DNA Detected does  not preclude the possibility of infection with hepatitis B virus.  In  particular, caution should be used when interpreting low level positive  results when the test is used for diagnostic purposes.    Total NET: -320 mL

## 2018-05-03 DIAGNOSIS — B19.10 UNSPECIFIED VIRAL HEPATITIS B WITHOUT HEPATIC COMA: ICD-10-CM

## 2018-05-03 DIAGNOSIS — N18.5 CHRONIC KIDNEY DISEASE, STAGE 5: ICD-10-CM

## 2018-05-03 DIAGNOSIS — E87.5 HYPERKALEMIA: ICD-10-CM

## 2018-05-03 DIAGNOSIS — D64.9 ANEMIA, UNSPECIFIED: ICD-10-CM

## 2018-05-03 DIAGNOSIS — R94.31 ABNORMAL ELECTROCARDIOGRAM [ECG] [EKG]: ICD-10-CM

## 2018-05-03 DIAGNOSIS — G47.33 OBSTRUCTIVE SLEEP APNEA (ADULT) (PEDIATRIC): ICD-10-CM

## 2018-05-03 DIAGNOSIS — Z99.2 DEPENDENCE ON RENAL DIALYSIS: ICD-10-CM

## 2018-05-03 DIAGNOSIS — J90 PLEURAL EFFUSION, NOT ELSEWHERE CLASSIFIED: ICD-10-CM

## 2018-05-03 DIAGNOSIS — N17.9 ACUTE KIDNEY FAILURE, UNSPECIFIED: ICD-10-CM

## 2018-05-03 DIAGNOSIS — N18.6 END STAGE RENAL DISEASE: ICD-10-CM

## 2018-05-03 DIAGNOSIS — J18.9 PNEUMONIA, UNSPECIFIED ORGANISM: ICD-10-CM

## 2018-05-03 DIAGNOSIS — R06.02 SHORTNESS OF BREATH: ICD-10-CM

## 2018-05-03 DIAGNOSIS — I12.0 HYPERTENSIVE CHRONIC KIDNEY DISEASE WITH STAGE 5 CHRONIC KIDNEY DISEASE OR END STAGE RENAL DISEASE: ICD-10-CM

## 2018-05-03 DIAGNOSIS — R74.8 ABNORMAL LEVELS OF OTHER SERUM ENZYMES: ICD-10-CM

## 2018-05-03 DIAGNOSIS — M10.9 GOUT, UNSPECIFIED: ICD-10-CM

## 2018-05-03 DIAGNOSIS — E87.70 FLUID OVERLOAD, UNSPECIFIED: ICD-10-CM

## 2018-05-03 DIAGNOSIS — Z99.89 DEPENDENCE ON OTHER ENABLING MACHINES AND DEVICES: ICD-10-CM

## 2018-05-03 DIAGNOSIS — E66.9 OBESITY, UNSPECIFIED: ICD-10-CM

## 2018-05-07 LAB
BCP MUTATIONS: SIGNIFICANT CHANGE UP
HBV GENTYP SERPL NAA+PROBE: SIGNIFICANT CHANGE UP
POLYMERASE MUTATIONS: SIGNIFICANT CHANGE UP
PRECORE MUTATIONS: SIGNIFICANT CHANGE UP

## 2019-01-17 NOTE — DISCHARGE NOTE ADULT - USE THE 5 A'S (ASK, ADVISE, ASSESS, ASSIST, ARRANGE)
Chief Complaint   Patient presents with   • Anemia       Subjective     HPI    Inez Olivares is a 47 y.o. female with a past medical history noted below who presents for evaluation of iron deficiency anemia.  This was found on routine labs with her pcp in December.  Her Hb was found to be 10.4 and ferritin 6.96-- representing a moderately severe anemia.  She was not having any particular symptoms of the anemia.  She has not had any overt bleeding.  Not taking excess NSAIDs.  She is still having periods and at at times they are heavy though she does not feel they are problematic.  She used to donate blood about 2 x per year but does not recall donating at all last year due to being told her iron was too low. Not a vegetarian or vegan, eats a healthy diet.  She does not have any issues with abdominal pain, reflux, nausea, vomiting, diarrhea, nor constipation.    Mother with colon polyps.  No GI malignancies in her family, no celiac or IBD.  Works for [employer].  No smoking, no excess ETOH.  Works at [employer].        Past Medical History:   Diagnosis Date   • Anemia    • Asthma     ALLERGY RELATED   • PONV (postoperative nausea and vomiting)          Current Outpatient Medications:   •  ibuprofen (ADVIL,MOTRIN) 200 MG tablet, Take 200 mg by mouth Every 6 (Six) Hours As Needed for Mild Pain ., Disp: , Rfl:   •  IRON-VITAMINS PO, Take  by mouth., Disp: , Rfl:   •  Multiple Vitamins-Minerals (MULTIVITAMIN ADULT PO), Take  by mouth., Disp: , Rfl:     No Known Allergies    Social History     Socioeconomic History   • Marital status:      Spouse name: Not on file   • Number of children: Not on file   • Years of education: Not on file   • Highest education level: Not on file   Social Needs   • Financial resource strain: Not on file   • Food insecurity - worry: Not on file   • Food insecurity - inability: Not on file   • Transportation needs - medical: Not on file   • Transportation needs - non-medical: Not on file    Occupational History   • Not on file   Tobacco Use   • Smoking status: Never Smoker   • Smokeless tobacco: Never Used   Substance and Sexual Activity   • Alcohol use: Yes     Comment: occ   • Drug use: No   • Sexual activity: No     Comment: lmp 3/5/16   Other Topics Concern   • Not on file   Social History Narrative   • Not on file       Family History   Problem Relation Age of Onset   • Uterine cancer Mother    • Heart attack Mother    • Hypertension Mother    • Depression Mother    • Arthritis Mother    • Cataracts Mother    • Alcohol abuse Father    • COPD Father         smoker   • Depression Sister    • Lung disease Maternal Grandmother    • Stroke Paternal Grandmother        Review of Systems   Constitutional: Negative for activity change, appetite change and fatigue.   HENT: Negative for sore throat and trouble swallowing.    Respiratory: Negative.    Cardiovascular: Negative.    Gastrointestinal: Negative for abdominal distention, abdominal pain and blood in stool.   Endocrine: Negative for cold intolerance and heat intolerance.   Genitourinary: Negative for difficulty urinating, dysuria and frequency.   Musculoskeletal: Negative for arthralgias, back pain and myalgias.   Skin: Negative.    Hematological: Negative for adenopathy. Does not bruise/bleed easily.   All other systems reviewed and are negative.      Objective     Vitals:    01/17/19 1043   BP: 110/68   Temp: 97.9 °F (36.6 °C)         01/17/19  1043   Weight: 77.8 kg (171 lb 9.6 oz)     Body mass index is 29.46 kg/m².    Physical Exam   Constitutional: She is oriented to person, place, and time. She appears well-developed and well-nourished. No distress.   HENT:   Head: Normocephalic and atraumatic.   Right Ear: External ear normal.   Left Ear: External ear normal.   Nose: Nose normal.   Mouth/Throat: Oropharynx is clear and moist.   Eyes: Conjunctivae and EOM are normal. Right eye exhibits no discharge. Left eye exhibits no discharge. No scleral  icterus.   Neck: Normal range of motion. Neck supple. No thyromegaly present.   No supraclavicular adenopathy   Cardiovascular: Normal rate, regular rhythm, normal heart sounds and intact distal pulses. Exam reveals no gallop.   No murmur heard.  No lower extremity edema   Pulmonary/Chest: Effort normal and breath sounds normal. No respiratory distress. She has no wheezes.   Abdominal: Soft. Normal appearance and bowel sounds are normal. She exhibits no distension and no mass. There is no hepatosplenomegaly. There is no tenderness. There is no rigidity, no rebound and no guarding. No hernia.   Genitourinary:   Genitourinary Comments: Rectal exam deferred   Musculoskeletal: Normal range of motion. She exhibits no edema or tenderness.   No atrophy of upper or lower extremities.  Normal digits and nails of both hands.   Lymphadenopathy:     She has no cervical adenopathy.   Neurological: She is alert and oriented to person, place, and time. She displays no atrophy. Coordination normal.   Skin: Skin is warm and dry. No rash noted. She is not diaphoretic. No erythema.   Psychiatric: She has a normal mood and affect. Her behavior is normal. Judgment and thought content normal.   Vitals reviewed.      WBC   Date Value Ref Range Status   12/13/2018 6.06 4.50 - 10.70 10*3/mm3 Final     RBC   Date Value Ref Range Status   12/13/2018 4.34 3.90 - 5.20 10*6/mm3 Final     Hemoglobin   Date Value Ref Range Status   12/13/2018 10.4 (L) 11.9 - 15.5 g/dL Final   03/24/2016 11.4 (L) 11.9 - 15.5 g/dL Final     Hematocrit   Date Value Ref Range Status   12/13/2018 34.7 (L) 35.6 - 45.5 % Final   03/24/2016 35.3 (L) 35.6 - 45.5 % Final     MCV   Date Value Ref Range Status   12/13/2018 80.0 (L) 80.5 - 98.2 fL Final   03/24/2016 82.7 80.5 - 98.2 fL Final     MCH   Date Value Ref Range Status   12/13/2018 24.0 (L) 26.9 - 32.0 pg Final   03/24/2016 26.7 (L) 26.9 - 32.7 pg Final     MCHC   Date Value Ref Range Status   12/13/2018 30.0 (L)  32.4 - 36.3 g/dL Final   03/24/2016 32.3 (L) 32.4 - 36.3 g/dL Final     RDW   Date Value Ref Range Status   12/13/2018 14.4 (H) 11.7 - 13.0 % Final   03/24/2016 12.7 11.7 - 13.0 % Final     RDW-SD   Date Value Ref Range Status   03/24/2016 38.2 37.0 - 54.0 fl Final     MPV   Date Value Ref Range Status   03/24/2016 9.3 6.0 - 12.0 fL Final     Platelets   Date Value Ref Range Status   12/13/2018 267 140 - 500 10*3/mm3 Final   03/24/2016 229 140 - 500 10*3/mm3 Final     Neutrophil %   Date Value Ref Range Status   03/24/2016 63.9 42.7 - 76.0 % Final     Neutrophil Rel %   Date Value Ref Range Status   12/13/2018 66.2 42.7 - 76.0 % Final     Lymphocyte %   Date Value Ref Range Status   03/24/2016 24.6 19.6 - 45.3 % Final     Lymphocyte Rel %   Date Value Ref Range Status   12/13/2018 22.3 19.6 - 45.3 % Final     Monocyte %   Date Value Ref Range Status   03/24/2016 9.3 5.0 - 12.0 % Final     Monocyte Rel %   Date Value Ref Range Status   12/13/2018 8.7 5.0 - 12.0 % Final     Eosinophil %   Date Value Ref Range Status   03/24/2016 1.8 0.3 - 6.2 % Final     Eosinophil Rel %   Date Value Ref Range Status   12/13/2018 2.1 0.3 - 6.2 % Final     Basophil %   Date Value Ref Range Status   03/24/2016 0.4 0.0 - 1.5 % Final     Basophil Rel %   Date Value Ref Range Status   12/13/2018 0.7 0.0 - 1.5 % Final     Immature Grans %   Date Value Ref Range Status   03/24/2016 0.0 0.0 - 0.5 % Final     Neutrophils, Absolute   Date Value Ref Range Status   03/24/2016 3.57 1.90 - 8.10 10*3/mm3 Final     Neutrophils Absolute   Date Value Ref Range Status   12/13/2018 4.01 1.90 - 8.10 10*3/mm3 Final     Lymphocytes, Absolute   Date Value Ref Range Status   03/24/2016 1.37 0.90 - 4.80 10*3/mm3 Final     Lymphocytes Absolute   Date Value Ref Range Status   12/13/2018 1.35 0.90 - 4.80 10*3/mm3 Final     Monocytes, Absolute   Date Value Ref Range Status   03/24/2016 0.52 0.20 - 1.20 10*3/mm3 Final     Monocytes Absolute   Date Value Ref Range  Status   12/13/2018 0.53 0.20 - 1.20 10*3/mm3 Final     Eosinophils, Absolute   Date Value Ref Range Status   03/24/2016 0.10 0.00 - 0.70 10*3/mm3 Final     Eosinophils Absolute   Date Value Ref Range Status   12/13/2018 0.13 0.00 - 0.70 10*3/mm3 Final     Basophils, Absolute   Date Value Ref Range Status   03/24/2016 0.02 0.00 - 0.20 10*3/mm3 Final     Basophils Absolute   Date Value Ref Range Status   12/13/2018 0.04 0.00 - 0.20 10*3/mm3 Final     Immature Grans, Absolute   Date Value Ref Range Status   03/24/2016 0.00 0.00 - 0.03 10*3/mm3 Final       Sodium   Date Value Ref Range Status   12/13/2018 140 136 - 145 mmol/L Final     Potassium   Date Value Ref Range Status   12/13/2018 4.3 3.5 - 5.2 mmol/L Final     Total CO2   Date Value Ref Range Status   12/13/2018 28.0 22.0 - 29.0 mmol/L Final     Chloride   Date Value Ref Range Status   12/13/2018 101 98 - 107 mmol/L Final     Creatinine   Date Value Ref Range Status   12/13/2018 0.68 0.57 - 1.00 mg/dL Final     BUN   Date Value Ref Range Status   12/13/2018 17 6 - 20 mg/dL Final     BUN/Creatinine Ratio   Date Value Ref Range Status   12/13/2018 25.0 7.0 - 25.0 Final     Calcium   Date Value Ref Range Status   12/13/2018 9.2 8.6 - 10.5 mg/dL Final     eGFR Non  Am   Date Value Ref Range Status   12/13/2018 92 >60 mL/min/1.73 Final     Alkaline Phosphatase   Date Value Ref Range Status   12/13/2018 59 39 - 117 U/L Final     ALT (SGPT)   Date Value Ref Range Status   12/13/2018 20 1 - 33 U/L Final     AST (SGOT)   Date Value Ref Range Status   12/13/2018 19 1 - 32 U/L Final     Total Bilirubin   Date Value Ref Range Status   12/13/2018 0.3 0.1 - 1.2 mg/dL Final     Albumin   Date Value Ref Range Status   12/13/2018 4.10 3.50 - 5.20 g/dL Final     A/G Ratio   Date Value Ref Range Status   12/13/2018 2.0 g/dL Final         Imaging Results (last 7 days)     ** No results found for the last 168 hours. **            No notes on file    Assessment/Plan    Iron  deficiency anemia: suspect gradual onset.  ? Menses though she does not seem to think this has been to abnormal for her vs GI loss, she does have family history of polyps    Family history of colon polyps: mother with colon polyps    Menorrhagia: she does seem to describe menses that starts fairly heavy but doesn't feel this is too abnormal    Plan    Egd and colonoscopy for work up of GLORIA    Continue the iron pills    Follow up with your gyencologist regarding your periods, may need OCP      Inez was seen today for anemia.    Diagnoses and all orders for this visit:    Iron deficiency anemia, unspecified iron deficiency anemia type  -     Case Request; Standing  -     Follow Anesthesia Guidelines / Standing Orders; Future  -     Implement Anesthesia Orders Day of Procedure; Standing  -     Obtain Informed Consent; Standing  -     lactated ringers infusion; Infuse 30 mL/hr into a venous catheter Continuous.  -     Case Request    Family history of polyps in the colon  -     Case Request; Standing  -     Follow Anesthesia Guidelines / Standing Orders; Future  -     Implement Anesthesia Orders Day of Procedure; Standing  -     Obtain Informed Consent; Standing  -     lactated ringers infusion; Infuse 30 mL/hr into a venous catheter Continuous.  -     Case Request    Menorrhagia with regular cycle        I have discussed the above plan with the patient.  They verbalize understanding and are in agreement with the plan.  They have been advised to contact the office for any questions, concerns, or changes related to their health.    Dictated utilizing Dragon dictation   Statement Selected
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
## 2021-06-03 NOTE — PHYSICAL THERAPY INITIAL EVALUATION ADULT - SITTING BALANCE: DYNAMIC
Nursing Focus: Admission    Pulse 82, temperature 96.2  F (35.7  C), temperature source Axillary, resp. rate 16, weight 95.8 kg (211 lb 4.8 oz), SpO2 98 %.    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    I: Upon arrival to the unit patient was oriented to room, unit, and call light. Patient s weight, and vital signs were obtained. Allergies reviewed and allergy band applied. MD notified of patient s arrival on the unit. Adult AVS completed. Head to toe assessment completed. Full skin assessment completed. Education assessment completed. Care plan initiated.    A: Vital signs stable upon admission. Patient reports pain on his throat. Patient has radiation wound around his neck. Reports having difficulty swallowing both solids and fluids due to pain. On O2 2L NC for comfort. Reports discomfort r/t phlegm and allergies.     P: Continue to monitor patient s pain level and intervene as needed. Continue with plan of care. Notify MD with any concerns or changes in patient status.    Continuous LR infusing @100mL/hr. NPO after midnight for potential PEG tube insertion. Uses methadone and Norco for pain management, currently waiting for pharmacy for supply. Next shift to replace electrolytes as appropriate.    Continue w/ POC.   good balance

## 2024-04-26 NOTE — PROGRESS NOTE ADULT - SUBJECTIVE AND OBJECTIVE BOX
FAROOQ DOWD  57y  Male    Patient is a 57y old  Male who presents with a chief complaint of BEE  PLEURAL  EFFUSION  ANEMIA RENAL  FAILURE (28 Apr 2018 07:46)      HPI:  PT  C/O  BEE  THIS  AM  WHILE  WALKING  IN  KITCHEN  NO  CP  NO  PALPITATIONS  EVALUATED  IN  ER  FOUND  TO  HAVE  BILATERAL  PLEURAL  EFFUSIONS  SEVERE  ANEMIA  ACUTE  ON  CHRONIC  RENAL  FAILURE TROPONIN  ELEVATION  ADMITTED  FOR  FURTHER  W/U  AND  TREATMENT (20 Apr 2018 13:08)      seen on dialysis.   perma cath works well.  AVG has a good bruit and thrill.          PHYSICAL EXAM:    T(C): 36.4 (04-28-18 @ 15:44), Max: 37 (04-27-18 @ 23:47)  HR: 81 (04-28-18 @ 15:44) (64 - 87)  BP: 168/89 (04-28-18 @ 15:44) (150/80 - 168/89)  RR: 16 (04-28-18 @ 15:44) (16 - 17)  SpO2: 99% (04-28-18 @ 15:44) (97% - 99%)  Wt(kg): --    I&O's Detail    27 Apr 2018 07:01  -  28 Apr 2018 07:00  --------------------------------------------------------  IN:    Oral Fluid: 250 mL  Total IN: 250 mL    OUT:    Voided: 500 mL  Total OUT: 500 mL    Total NET: -250 mL          Respiratory: clear anteriorly, decreased BS at bases  Cardiovascular: S1 S2  Gastrointestinal: soft NT ND +BS  Extremities: edema   Neuro: Awake and alert    MEDICATIONS  (STANDING):  amLODIPine   Tablet 10 milliGRAM(s) Oral daily  AQUAPHOR (petrolatum Ointment) 1 Application(s) Topical two times a day  carvedilol 12.5 milliGRAM(s) Oral every 12 hours  cholecalciferol 400 Unit(s) Oral daily  cloNIDine 0.2 milliGRAM(s) Oral every 8 hours    MEDICATIONS  (PRN):  acetaminophen   Tablet 650 milliGRAM(s) Oral every 6 hours PRN For Temp greater than 38 C (100.4 F)  acetaminophen   Tablet. 650 milliGRAM(s) Oral every 6 hours PRN Mild Pain (1 - 3)  acetaminophen  IVPB. 1000 milliGRAM(s) IV Intermittent once PRN Moderate Pain (4 - 6)                            8.4    11.73 )-----------( 119      ( 27 Apr 2018 06:19 )             25.0       04-27    137  |  99  |  67<H>  ----------------------------<  108<H>  4.7   |  26  |  6.49<H>    Ca    7.7<L>      27 Apr 2018 06:19 24

## 2024-09-27 NOTE — PRE-OP CHECKLIST - AS TEMP SITE
Constant observation Constant observation Constant observation Constant observation Constant observation Constant observation Constant observation Constant observation oral Constant observation Constant observation Constant observation Constant observation Constant observation Constant observation